# Patient Record
Sex: MALE | Race: ASIAN | NOT HISPANIC OR LATINO | Employment: UNEMPLOYED | URBAN - METROPOLITAN AREA
[De-identification: names, ages, dates, MRNs, and addresses within clinical notes are randomized per-mention and may not be internally consistent; named-entity substitution may affect disease eponyms.]

---

## 2017-05-18 ENCOUNTER — OFFICE VISIT (OUTPATIENT)
Dept: OPHTHALMOLOGY | Facility: CLINIC | Age: 62
End: 2017-05-18
Payer: COMMERCIAL

## 2017-05-18 DIAGNOSIS — E11.3299 DIABETES MELLITUS WITH BACKGROUND RETINOPATHY (H): ICD-10-CM

## 2017-05-18 DIAGNOSIS — H52.4 PRESBYOPIA: ICD-10-CM

## 2017-05-18 DIAGNOSIS — H00.029 MEIBOMITIS, UNSPECIFIED LATERALITY: ICD-10-CM

## 2017-05-18 DIAGNOSIS — Z01.01 ENCOUNTER FOR EXAMINATION OF EYES AND VISION WITH ABNORMAL FINDINGS: Primary | ICD-10-CM

## 2017-05-18 DIAGNOSIS — Z96.1 PSEUDOPHAKIA: ICD-10-CM

## 2017-05-18 DIAGNOSIS — H04.203 EPIPHORA, BILATERAL: ICD-10-CM

## 2017-05-18 PROCEDURE — 92015 DETERMINE REFRACTIVE STATE: CPT | Performed by: OPHTHALMOLOGY

## 2017-05-18 PROCEDURE — 92014 COMPRE OPH EXAM EST PT 1/>: CPT | Performed by: OPHTHALMOLOGY

## 2017-05-18 ASSESSMENT — REFRACTION_MANIFEST
OD_CYLINDER: +0.50
OD_SPHERE: +0.25
OS_ADD: +2.50
OS_SPHERE: -1.25
OS_CYLINDER: +1.00
OD_ADD: +2.50
OS_AXIS: 001
OD_AXIS: 012

## 2017-05-18 ASSESSMENT — VISUAL ACUITY
OS_PH_SC: 40+2
OD_SC: 20/25
OS_SC+: +1
OS_SC: 20/60
METHOD: SNELLEN - LINEAR

## 2017-05-18 ASSESSMENT — SLIT LAMP EXAM - LIDS
COMMENTS: 2+ DERMATOCHALASIS - UPPER LID, JONES 1 SNIP APPEARS OPEN LEFT EYE; CLOSED OD, UPPER PUNCTUM APPEARS OPEN;  1+ MEIBOMIAN GLAND DYSFUNCTION
COMMENTS: 2+ DERMATOCHALASIS - UPPER LID, JONES 1 SNIP APPEARS OPEN LEFT EYE; CLOSED OD, UPPER PUNCTUM APPEARS OPEN;  1+ MEIBOMIAN GLAND DYSFUNCTION

## 2017-05-18 ASSESSMENT — EXTERNAL EXAM - RIGHT EYE: OD_EXAM: NORMAL

## 2017-05-18 ASSESSMENT — TONOMETRY
OD_IOP_MMHG: 15
IOP_METHOD: APPLANATION
OS_IOP_MMHG: 13

## 2017-05-18 ASSESSMENT — CUP TO DISC RATIO
OS_RATIO: 0.4
OD_RATIO: 0.4

## 2017-05-18 ASSESSMENT — CONF VISUAL FIELD
OD_NORMAL: 1
OS_NORMAL: 1

## 2017-05-18 ASSESSMENT — EXTERNAL EXAM - LEFT EYE: OS_EXAM: NORMAL

## 2017-05-18 NOTE — PROGRESS NOTES
Current Eye Medications:  Prescription allergy drop both eyes each morning.  He is unsure of the name.       Subjective:  Comprehensive Eye Exam.  Patient is here for a Diabetic Eye Exam.  He has noticed decreasing vision in each eye, distance and near.  He has over-the-counter readers, but still feels his reading vision is blurred.    Patient is here with an , but mostly understands and speaks English.       Objective:  See Ophthalmology Exam.       Assessment:  Stable mild background diabetic retinopathy both eyes. Posterior capsule opacity approaching visual significance left eye.      ICD-10-CM    1. Encounter for examination of eyes and vision with abnormal findings Z01.01 REFRACTIVE STATUS   2. Presbyopia H52.4 REFRACTIVE STATUS   3. Diabetes mellitus with background retinopathy (H) E11.319 EYE EXAM (SIMPLE-NONBILLABLE)   4. Pseudophakia, os Z96.1    5. Meibomitis, unspecified laterality H00.019    6. Epiphora, bilateral H04.203         Plan: Possible posterior vitreous detachment (sudden onset large floater and/or flashing lights) discussed.   Possible clouding of posterior capsule discussed. Left eye   Stop Maxitrol in your left eye  Glasses Rx given - optional   Call in January 2018 for an appointment in May 2018 for Complete Exam    Dr. Hirsch [phone]

## 2017-05-18 NOTE — MR AVS SNAPSHOT
After Visit Summary   5/18/2017    Heaven Rowe    MRN: [id number]           Patient Information     Date Of Birth          1955        Visit Information        Provider Department      5/18/2017 8:15 AM Jose Ramon Hirsch MD; [employer] [facility]        Today's Diagnoses     Encounter for examination of eyes and vision with abnormal findings    -  1    Presbyopia        Myopia, left        Astigmatism, bilateral        Diabetes mellitus with background retinopathy (H)        Pseudophakia, os        Meibomitis, unspecified laterality        Epiphora, bilateral          Care Instructions    Possible posterior vitreous detachment (sudden onset large floater and/or flashing lights) discussed.   Possible clouding of posterior capsule discussed. Left eye   Stop Maxitrol in your left eye  Glasses Rx given - optional   Call in January 2018 for an appointment in May 2018 for Complete Exam    Dr. Hirsch [phone]    Diabetes weakens the blood vessels all over the body, including the eyes. Damage to the blood vessels in the eyes can cause swelling or bleeding into part of the eye (called the retina). This is called diabetic retinopathy ([facility]-tin--[facility]-the). If not treated, this disease can cause vision loss or blindness.   Symptoms may include blurred or distorted vision, but many people have no symptoms. It's important to see your eye doctor regularly to check for problems.   Early treatment and good control can help protect your vision. Here are the things you can do to help prevent vision loss:      1. Keep your blood sugar levels under tight control.      2. Bring high blood pressure under control.      3. No smoking.      4. Have yearly dilated eye exams.          Follow-ups after your visit        Who to contact     If you have questions or need follow up information about today's clinic visit or your schedule please contact [facility] directly at  "[phone].  Normal or non-critical lab and imaging results will be communicated to you by MyChart, letter or phone within 4 business days after the clinic has received the results. If you do not hear from us within 7 days, please contact the clinic through [employer] or phone. If you have a critical or abnormal lab result, we will notify you by phone as soon as possible.  Submit refill requests through [employer] or call your pharmacy and they will forward the refill request to us. Please allow 3 business days for your refill to be completed.          Additional Information About Your Visit        youbeQ - [employer] Information     [employer] lets you send messages to your doctor, view your test results, renew your prescriptions, schedule appointments and more. To sign up, go to [email or web address] . Click on \"Log in\" on the left side of the screen, which will take you to the Welcome page. Then click on \"Sign up Now\" on the right side of the page.     You will be asked to enter the access code listed below, as well as some personal information. Please follow the directions to create your username and password.     Your access code is: [email or web address]  Expires: 2017  9:37 AM     Your access code will  in 90 days. If you need help or a new code, please call your [place] clinic or [phone].        Care EveryWhere ID     This is your Care EveryWhere ID. This could be used by other organizations to access your [place] medical records  [id number]         Blood Pressure from Last 3 Encounters:   11 128/86    Weight from Last 3 Encounters:   11 55.6 kg (122 lb 9.2 oz)              We Performed the Following     EYE EXAM (SIMPLE-NONBILLABLE)     REFRACTIVE STATUS        Primary Care Provider Office Phone # Fax #    Cesar Rowe [phone]       [address] [address]        Thank you!     Thank you for choosing [facility]  for your care. Our " goal is always to provide you with excellent care. Hearing back from our patients is one way we can continue to improve our services. Please take a few minutes to complete the written survey that you may receive in the mail after your visit with us. Thank you!             Your Updated Medication List - Protect others around you: Learn how to safely use, store and throw away your medicines at www.disposemymeds.org.          This list is accurate as of: 5/18/17  9:37 AM.  Always use your most recent med list.                   Brand Name Dispense Instructions for use    aspirin 325 MG tablet      Take 81 mg by mouth daily       ATENOLOL PO      Take  by mouth.       ketorolac 0.5 % ophthalmic solution    ACULAR    1 Bottle    Place 1 drop into both eyes 4 times daily as needed       ketotifen 0.025 % Soln ophthalmic solution    ZADITOR    1 Bottle    Place 1 drop into both eyes 2 times daily       lisinopril 5 MG tablet    PRINIVIL/ZESTRIL     Take 5 mg by mouth daily.       METFORMIN HCL PO      Take 500 mg by mouth daily.       VYTORIN PO      Take  by mouth.

## 2017-05-18 NOTE — LETTER
"                                                                                                   Jose Ramon Hirsch M.D.  [facility]  [address] [place] [address]        May 18, 2017  Heaven Rowe   MRN: [id number]    Dear Dr. Rowe,          Thank you for referring Heaven Rowe, regarding his annual diabetic eye exam.  I had an opportunity to evaluate him on 5/18/2017.  Following  are my findings.     Current Eye Medications:  Prescription allergy drop both eyes each morning.  He is unsure of the name.       Subjective:  Comprehensive Eye Exam.  Patient is here for a Diabetic Eye Exam.  He has noticed decreasing vision in each eye, distance and near.  He has over-the-counter readers, but still feels his reading vision is blurred.    Patient is here with an , but mostly understands and speaks English.       Objective:  See Ophthalmology Exam.       Assessment:  Stable mild background diabetic retinopathy both eyes. Posterior capsule opacity approaching visual significance left eye.      ICD-10-CM    1. Encounter for examination of eyes and vision with abnormal findings Z01.01 REFRACTIVE STATUS   2. Presbyopia H52.4 REFRACTIVE STATUS   3. Diabetes mellitus with background retinopathy (H) E11.319 EYE EXAM (SIMPLE-NONBILLABLE)   4. Pseudophakia, os Z96.1    5. Meibomitis, unspecified laterality H00.019    6. Epiphora, bilateral H04.203         Plan: Possible posterior vitreous detachment (sudden onset large floater and/or flashing lights) discussed.   Possible clouding of posterior capsule discussed. Left eye   Stop Maxitrol in your left eye  Glasses Rx given - optional   Call in January 2018 for an appointment in May 2018 for Complete Exam    Dr. Hirsch [phone]             Base Eye Exam     Visual Acuity (Snellen - Linear)      Right Left   Dist sc 20/25 20/60 +1   Dist ph sc  40+2   Near sc 16@ 24\" 2- @24\"         Tonometry (Applanation, 8:52 AM)      " Right Left   Pressure 15 13       Anterior chamber is slightly shallow, right eye, but adequate.      Pupils      Dark Light APD   Right 4 3 None   Left 4.5 4 None         Visual Fields      Right Left   Result Full Full         Extraocular Movement      Right Left   Result Full, Ortho Full, Ortho         Neuro/Psych     Oriented x3:  Yes    Mood/Affect:  Normal      Dilation     Both eyes:  1.0% Mydriacyl, 2.5% Gurpreet Synephrine @ 8:54 AM            Slit Lamp and Fundus Exam     External Exam      Right Left    External Normal Normal      Slit Lamp Exam      Right Left    Lids/Lashes 2+ Dermatochalasis - upper lid, dahl 1 snip appears open left eye; closed od, upper punctum appears open;  1+ Meibomian gland dysfunction 2+ Dermatochalasis - upper lid, dahl 1 snip appears open left eye; closed od, upper punctum appears open;  1+ Meibomian gland dysfunction    Conjunctiva/Sclera Pinguecula N/T Pinguecula N/T    Cornea Clear Clear    Anterior Chamber Deep and quiet Deep and quiet    Iris Dilated Dilated    Lens Clear Centered posterior chamber intraocular lens, 1-2+ Posterior capsular opacification, superior toward va    Vitreous Normal Normal      Fundus Exam      Right Left    Disc Normal Normal    C/D Ratio 0.4 0.4    Macula Normal Normal    Vessels Normal Normal    Periphery rare dot rare dot            Refraction     Wearing Rx     +2.50 over-the-counter readers.  Didn't bring.      Manifest Refraction      Sphere Cylinder Axis Dist Add Near   Right +0.25 +0.50 012 20/20 +2.50 1   Left -1.25 +1.00 001 20-1 +2.50 1         Final Rx      Sphere Cylinder Axis Add   Right +0.25 +0.50 012 +2.50   Left -1.25 +1.00 001 +2.50       Type:  Bifocal    Expiration Date:  5/19/2018    Comments:  Opposite signs in sphere.                Thank you again for allowing us to participate in the care of your patient.  If you have any further questions or concerns please feel free to contact me at [phone].      Sincerely,      Jose Ramon Hirsch M.D.  [facility]  [address] [place] [address]  [phone]

## 2017-05-18 NOTE — PATIENT INSTRUCTIONS
Possible posterior vitreous detachment (sudden onset large floater and/or flashing lights) discussed.   Possible clouding of posterior capsule discussed. Left eye   Stop Maxitrol in your left eye  Glasses Rx given - optional   Call in January 2018 for an appointment in May 2018 for Complete Exam    Dr. Hirsch [phone]    Diabetes weakens the blood vessels all over the body, including the eyes. Damage to the blood vessels in the eyes can cause swelling or bleeding into part of the eye (called the retina). This is called diabetic retinopathy ([facility]-tin--[facility]-thee). If not treated, this disease can cause vision loss or blindness.   Symptoms may include blurred or distorted vision, but many people have no symptoms. It's important to see your eye doctor regularly to check for problems.   Early treatment and good control can help protect your vision. Here are the things you can do to help prevent vision loss:      1. Keep your blood sugar levels under tight control.      2. Bring high blood pressure under control.      3. No smoking.      4. Have yearly dilated eye exams.

## 2018-05-30 ENCOUNTER — OFFICE VISIT (OUTPATIENT)
Dept: OPHTHALMOLOGY | Facility: CLINIC | Age: 63
End: 2018-05-30
Payer: COMMERCIAL

## 2018-05-30 DIAGNOSIS — Z96.1 PSEUDOPHAKIA: ICD-10-CM

## 2018-05-30 DIAGNOSIS — H00.029 MEIBOMIANITIS, UNSPECIFIED LATERALITY: ICD-10-CM

## 2018-05-30 DIAGNOSIS — E11.3299 DIABETES MELLITUS WITH BACKGROUND RETINOPATHY (H): Primary | ICD-10-CM

## 2018-05-30 DIAGNOSIS — H04.203 BILATERAL EPIPHORA: ICD-10-CM

## 2018-05-30 PROCEDURE — 92014 COMPRE OPH EXAM EST PT 1/>: CPT | Performed by: OPHTHALMOLOGY

## 2018-05-30 ASSESSMENT — CONF VISUAL FIELD
OD_NORMAL: 1
OS_NORMAL: 1

## 2018-05-30 ASSESSMENT — VISUAL ACUITY
OS_SC: J2
OS_SC: 20/40
OD_SC: J7
OD_SC: 20/20
METHOD: SNELLEN - LINEAR

## 2018-05-30 ASSESSMENT — TONOMETRY
OD_IOP_MMHG: 14
OS_IOP_MMHG: 17
IOP_METHOD: APPLANATION

## 2018-05-30 ASSESSMENT — REFRACTION_MANIFEST
OS_CYLINDER: +1.50
OS_SPHERE: -1.50
OD_ADD: +3.00
OD_CYLINDER: +0.75
OD_AXIS: 009
OD_SPHERE: +0.25
OS_ADD: +3.00
OS_AXIS: 177

## 2018-05-30 ASSESSMENT — CUP TO DISC RATIO
OS_RATIO: 0.4
OD_RATIO: 0.4

## 2018-05-30 ASSESSMENT — EXTERNAL EXAM - LEFT EYE: OS_EXAM: NORMAL

## 2018-05-30 ASSESSMENT — EXTERNAL EXAM - RIGHT EYE: OD_EXAM: NORMAL

## 2018-05-30 NOTE — PROGRESS NOTES
Current Eye Medications:  None     Subjective:  Complete diabetic eye exam   Patient complains that in the am, his left eye is stuck shut and has to clean it up to open. Left eye ceja a lot.     Objective:  See Ophthalmology Exam.       Assessment:  Stable mild background diabetic retinopathy both eyes.  Posterior capsule opacity left eye approaching visual significance.      ICD-10-CM    1. Diabetes mellitus with background retinopathy (H) E11.3299    2. Pseudophakia, os Z96.1    3. Meibomianitis, unspecified laterality H00.019    4. Bilateral epiphora H04.203         Plan:  Possible clouding of posterior capsule discussed left eye.  Possible posterior vitreous detachment (sudden onset large floater and/or flashing lights) discussed.   Use artificial tears up to 4 times daily both eyes. (Refresh Tears, Systane Ultra/Balance, or Theratears)   Glasses Rx given - optional   Call in January 2019 for an appointment in May 2019 for Complete Exam.    Dr. Hirsch [phone]

## 2018-05-30 NOTE — LETTER
5/30/2018         RE: Heaven Rowe  [address]  [place] [address]        Dear Colleague,    Thank you for referring your patient, Heaven Rowe, to the [facility]. Please see a copy of my visit note below.                Current Eye Medications:  None     Subjective:  Complete diabetic eye exam   Patient complains that in the am, his left eye is stuck shut and has to clean it up to open. Left eye ceja a lot.     Objective:  See Ophthalmology Exam.       Assessment:  Stable mild background diabetic retinopathy both eyes.  Posterior capsule opacity left eye approaching visual significance.      ICD-10-CM    1. Diabetes mellitus with background retinopathy (H) E11.3299    2. Pseudophakia, os Z96.1    3. Meibomianitis, unspecified laterality H00.019    4. Bilateral epiphora H04.203         Plan:  Possible clouding of posterior capsule discussed left eye.  Possible posterior vitreous detachment (sudden onset large floater and/or flashing lights) discussed.   Use artificial tears up to 4 times daily both eyes. (Refresh Tears, Systane Ultra/Balance, or Theratears)   Glasses Rx given - optional   Call in January 2019 for an appointment in May 2019 for Complete Exam.    Dr. Hirsch [phone]         Again, thank you for allowing me to participate in the care of your patient.        Sincerely,        Jose Ramon Hirsch MD

## 2018-05-30 NOTE — MR AVS SNAPSHOT
After Visit Summary   5/30/2018    Heaven Rowe    MRN: [id number]           Patient Information     Date Of Birth          1955        Visit Information        Provider Department      5/30/2018 8:15 AM Jose Ramon Hirsch MD; [facility] [employer] [facility]        Today's Diagnoses     Pseudophakia, os    -  1    Diabetes mellitus with background retinopathy (H)        Meibomianitis, unspecified laterality        Bilateral epiphora          Care Instructions    Possible clouding of posterior capsule discussed left eye.  Possible posterior vitreous detachment (sudden onset large floater and/or flashing lights) discussed.   Use artificial tears up to 4 times daily both eyes. (Refresh Tears, Systane Ultra/Balance, or Theratears)   Glasses Rx given - optional   Call in January 2019 for an appointment in May 2019 for Complete Exam.    Dr. Hirsch [phone]    Diabetes weakens the blood vessels all over the body, including the eyes. Damage to the blood vessels in the eyes can cause swelling or bleeding into part of the eye (called the retina). This is called diabetic retinopathy ([facility]-tin--[facility]-the). If not treated, this disease can cause vision loss or blindness.   Symptoms may include blurred or distorted vision, but many people have no symptoms. It's important to see your eye doctor regularly to check for problems.   Early treatment and good control can help protect your vision. Here are the things you can do to help prevent vision loss:      1. Keep your blood sugar levels under tight control.      2. Bring high blood pressure under control.      3. No smoking.      4. Have yearly dilated eye exams.           Follow-ups after your visit        Who to contact     If you have questions or need follow up information about today's clinic visit or your schedule please contact [facility] directly at [phone].  Normal or non-critical lab and imaging  results will be communicated to you by MyChart, letter or phone within 4 business days after the clinic has received the results. If you do not hear from us within 7 days, please contact the clinic through MyChart or phone. If you have a critical or abnormal lab result, we will notify you by phone as soon as possible.  Submit refill requests through [email or web address] or call your pharmacy and they will forward the refill request to us. Please allow 3 business days for your refill to be completed.          Additional Information About Your Visit        Care EveryWhere ID     This is your Care EveryWhere ID. This could be used by other organizations to access your [place] medical records  [id number]         Blood Pressure from Last 3 Encounters:   12/06/11 128/86    Weight from Last 3 Encounters:   12/06/11 55.6 kg (122 lb 9.2 oz)              Today, you had the following     No orders found for display       Primary Care Provider Office Phone # Fax #    Cesar Rowe [phone]       [place] [address]        Equal Access to Services     ONDINA VANCE : Hadii aad ku hadasho Soomaali, waaxda luqadaha, qaybta kaalmada adeegyada, waxay idiin haylaurita schaefer . So [facility] [phone].    ATENCIÓN: Si habla español, tiene a terry disposición servicios gratuitos de asistencia lingüística. [facility] [phone].    We comply with applicable federal civil rights laws and [place] laws. We do not discriminate on the basis of race, color, national origin, age, disability, sex, sexual orientation, or gender identity.            Thank you!     Thank you for choosing [facility]  for your care. Our goal is always to provide you with excellent care. Hearing back from our patients is one way we can continue to improve our services. Please take a few minutes to complete the written survey that you may receive in the mail after your visit with us. Thank you!              Your Updated Medication List - Protect others around you: Learn how to safely use, store and throw away your medicines at www.disposemymeds.org.          This list is accurate as of 5/30/18  9:39 AM.  Always use your most recent med list.                   Brand Name Dispense Instructions for use Diagnosis    aspirin 325 MG tablet      Take 81 mg by mouth daily        ATENOLOL PO      Take  by mouth.        ketorolac 0.5 % ophthalmic solution    ACULAR    1 Bottle    Place 1 drop into both eyes 4 times daily as needed    Epiphora, bilateral       ketotifen 0.025 % Soln ophthalmic solution    ZADITOR    1 Bottle    Place 1 drop into both eyes 2 times daily    Epiphora, unspecified laterality       lisinopril 5 MG tablet    PRINIVIL/ZESTRIL     Take 5 mg by mouth daily.        METFORMIN HCL PO      Take 500 mg by mouth daily.        VYTORIN PO      Take  by mouth.

## 2018-05-30 NOTE — PATIENT INSTRUCTIONS
Possible clouding of posterior capsule discussed left eye.  Possible posterior vitreous detachment (sudden onset large floater and/or flashing lights) discussed.   Use artificial tears up to 4 times daily both eyes. (Refresh Tears, Systane Ultra/Balance, or Theratears)   Glasses Rx given - optional   Call in January 2019 for an appointment in May 2019 for Complete Exam.    Dr. Hirsch [phone]    Diabetes weakens the blood vessels all over the body, including the eyes. Damage to the blood vessels in the eyes can cause swelling or bleeding into part of the eye (called the retina). This is called diabetic retinopathy ([facility]-tin-[facility]-puh-thee). If not treated, this disease can cause vision loss or blindness.   Symptoms may include blurred or distorted vision, but many people have no symptoms. It's important to see your eye doctor regularly to check for problems.   Early treatment and good control can help protect your vision. Here are the things you can do to help prevent vision loss:      1. Keep your blood sugar levels under tight control.      2. Bring high blood pressure under control.      3. No smoking.      4. Have yearly dilated eye exams.

## 2018-10-09 ENCOUNTER — OFFICE VISIT (OUTPATIENT)
Dept: OPHTHALMOLOGY | Facility: CLINIC | Age: 63
End: 2018-10-09
Payer: MEDICAID

## 2018-10-09 DIAGNOSIS — H04.203 BILATERAL EPIPHORA: ICD-10-CM

## 2018-10-09 DIAGNOSIS — E11.3299 DIABETES MELLITUS WITH BACKGROUND RETINOPATHY (H): ICD-10-CM

## 2018-10-09 DIAGNOSIS — Z96.1 PSEUDOPHAKIA: ICD-10-CM

## 2018-10-09 DIAGNOSIS — H34.8120 CENTRAL RETINAL VEIN OCCLUSION WITH MACULAR EDEMA OF LEFT EYE (H): Primary | ICD-10-CM

## 2018-10-09 PROCEDURE — 92014 COMPRE OPH EXAM EST PT 1/>: CPT | Performed by: OPHTHALMOLOGY

## 2018-10-09 PROCEDURE — T1013 SIGN LANG/ORAL INTERPRETER: HCPCS | Mod: U3 | Performed by: OPHTHALMOLOGY

## 2018-10-09 ASSESSMENT — TONOMETRY
OD_IOP_MMHG: 11
OS_IOP_MMHG: 11
IOP_METHOD: APPLANATION

## 2018-10-09 ASSESSMENT — EXTERNAL EXAM - RIGHT EYE: OD_EXAM: NORMAL

## 2018-10-09 ASSESSMENT — VISUAL ACUITY
OD_SC: 20/20
OS_SC: CF@2'
OD_SC+: -2
METHOD: SNELLEN - LINEAR

## 2018-10-09 ASSESSMENT — EXTERNAL EXAM - LEFT EYE: OS_EXAM: NORMAL

## 2018-10-09 ASSESSMENT — CUP TO DISC RATIO
OS_RATIO: 0.4
OD_RATIO: 0.4

## 2018-10-09 NOTE — LETTER
10/9/2018         RE: Heaven Rowe  [address]  [place] [address]        Dear Colleague,    Thank you for referring your patient, Heaven Rowe, to the [facility]. Please see a copy of my visit note below.     Current Eye Medications:  none     Subjective:  Decreased vision and Pressure feeling with tightness left eye over last 3 days. Vision is doing very well right eye.     Was seen in ER; brain imaging apparently normal     Objective:  See Ophthalmology Exam.       Assessment:  New central retinal vein occlusion left eye.      ICD-10-CM    1. Central retinal vein occlusion with macular edema of left eye H34.8120 OPHTHALMOLOGY ADULT REFERRAL   2. Diabetes mellitus with background retinopathy (H) E11.3299    3. Pseudophakia, os Z96.1    4. Bilateral epiphora H04.203         Plan:  Referral to [facility] for evaluation of new central retinal vein occlusion left eye.  Return visit as previously planned or sooner if suggested by retina doctor.     Jose Ramon Hirsch M.D.  [phone]           Again, thank you for allowing me to participate in the care of your patient.        Sincerely,        Jose Ramon Hirsch MD

## 2018-10-09 NOTE — PROGRESS NOTES
Current Eye Medications:  none     Subjective:  Decreased vision and Pressure feeling with tightness left eye over last 3 days. Vision is doing very well right eye.     Was seen in ER; brain imaging apparently normal     Objective:  See Ophthalmology Exam.       Assessment:  New central retinal vein occlusion left eye.      ICD-10-CM    1. Central retinal vein occlusion with macular edema of left eye H34.8120 OPHTHALMOLOGY ADULT REFERRAL   2. Diabetes mellitus with background retinopathy (H) E11.3299    3. Pseudophakia, os Z96.1    4. Bilateral epiphora H04.203         Plan:  Referral to [facility] for evaluation of new central retinal vein occlusion left eye.  Return visit as previously planned or sooner if suggested by retina doctor.     Jose Ramon Hirsch M.D.  [phone]

## 2018-10-09 NOTE — MR AVS SNAPSHOT
After Visit Summary   10/9/2018    Heaven Rowe    MRN: [id number]           Patient Information     Date Of Birth          1955        Visit Information        Provider Department      10/9/2018 2:00 PM Jose Ramon Hirsch MD; [facility]        Today's Diagnoses     Pseudophakia, os    -  1    Central retinal vein occlusion with macular edema of left eye          Care Instructions    Referral to [facility] for evaluation.  Return visit as previously planned or sooner if suggested by retina doctor.     Jose Ramon Hirsch M.D.  [phone]            Follow-ups after your visit        Additional Services     OPHTHALMOLOGY ADULT REFERRAL       Your provider has referred you to:  [facility]      Please be aware that coverage of these services is subject to the terms and limitations of your health insurance plan.  Call member services at your health plan with any benefit or coverage questions.      Please bring the following to your appointment:  >>   Any x-rays, CTs or MRIs which have been performed.  Contact the facility where they were done to arrange for  prior to your scheduled appointment.  Any new CT, MRI or other procedures ordered by your specialist must be performed at a [place] facility or coordinated by your clinic's referral office.    >>   List of current medications   >>   This referral request   >>   Any documents/labs given to you for this referral                  Who to contact     If you have questions or need follow up information about today's clinic visit or your schedule please contact [facility] directly at [phone].  Normal or non-critical lab and imaging results will be communicated to you by MyChart, letter or phone within 4 business days after the clinic has received the results. If you do not hear from us within 7 days, please contact the clinic through MyChart or phone. If you have a critical or  abnormal lab result, we will notify you by phone as soon as possible.  Submit refill requests through [facility] or call your pharmacy and they will forward the refill request to us. Please allow 3 business days for your refill to be completed.          Additional Information About Your Visit        Care EveryWhere ID     This is your Care EveryWhere ID. This could be used by other organizations to access your [place] medical records  [id number]         Blood Pressure from Last 3 Encounters:   12/06/11 128/86    Weight from Last 3 Encounters:   12/06/11 55.6 kg (122 lb 9.2 oz)              We Performed the Following     OPHTHALMOLOGY ADULT REFERRAL        Primary Care Provider Office Phone # Fax #    Cesar Rowe [phone]       [address] [address]        Equal Access to Services     ONDINA VANCE : Hadii aad ku hadasho Soomaali, waaxda luqadaha, qaybta kaalmada adeegyada, helen schaefer . So [facility] [phone].    ATENCIÓN: Si habla español, tiene a terry disposición servicios gratuitos de asistencia lingüística. Llame al [phone].    We comply with applicable federal civil rights laws and [place] laws. We do not discriminate on the basis of race, color, national origin, age, disability, sex, sexual orientation, or gender identity.            Thank you!     Thank you for choosing [facility]  for your care. Our goal is always to provide you with excellent care. Hearing back from our patients is one way we can continue to improve our services. Please take a few minutes to complete the written survey that you may receive in the mail after your visit with us. Thank you!             Your Updated Medication List - Protect others around you: Learn how to safely use, store and throw away your medicines at www.disposemymeds.org.          This list is accurate as of 10/9/18  3:24 PM.  Always use your most recent med list.                    Brand Name Dispense Instructions for use Diagnosis    aspirin 325 MG tablet      Take 81 mg by mouth daily        ATENOLOL PO      Take  by mouth.        ketorolac 0.5 % ophthalmic solution    ACULAR    1 Bottle    Place 1 drop into both eyes 4 times daily as needed    Epiphora, bilateral       ketotifen 0.025 % Soln ophthalmic solution    ZADITOR    1 Bottle    Place 1 drop into both eyes 2 times daily    Epiphora, unspecified laterality       lisinopril 5 MG tablet    PRINIVIL/ZESTRIL     Take 5 mg by mouth daily.        METFORMIN HCL PO      Take 500 mg by mouth daily.        VYTORIN PO      Take  by mouth.

## 2018-10-09 NOTE — PATIENT INSTRUCTIONS
Referral to [facility] for evaluation.  Return visit as previously planned or sooner if suggested by retina doctor.     Jose Ramon Hirsch M.D.  [phone]

## 2018-10-29 ENCOUNTER — DOCUMENTATION ONLY (OUTPATIENT)
Dept: OPHTHALMOLOGY | Facility: CLINIC | Age: 63
End: 2018-10-29

## 2018-11-30 ENCOUNTER — APPOINTMENT (OUTPATIENT)
Dept: OPTOMETRY | Facility: CLINIC | Age: 63
End: 2018-11-30
Payer: COMMERCIAL

## 2018-11-30 PROCEDURE — 92341 FIT SPECTACLES BIFOCAL: CPT | Performed by: OPTOMETRIST

## 2018-12-28 ENCOUNTER — DOCUMENTATION ONLY (OUTPATIENT)
Dept: OPHTHALMOLOGY | Facility: CLINIC | Age: 63
End: 2018-12-28

## 2019-06-03 ENCOUNTER — OFFICE VISIT (OUTPATIENT)
Dept: OPHTHALMOLOGY | Facility: CLINIC | Age: 64
End: 2019-06-03
Payer: COMMERCIAL

## 2019-06-03 DIAGNOSIS — H00.029 MEIBOMIANITIS, UNSPECIFIED LATERALITY: ICD-10-CM

## 2019-06-03 DIAGNOSIS — H34.8120 CENTRAL RETINAL VEIN OCCLUSION WITH MACULAR EDEMA OF LEFT EYE (H): ICD-10-CM

## 2019-06-03 DIAGNOSIS — H04.203 BILATERAL EPIPHORA: ICD-10-CM

## 2019-06-03 DIAGNOSIS — H26.492 POSTERIOR CAPSULAR OPACIFICATION VISUALLY SIGNIFICANT OF LEFT EYE: ICD-10-CM

## 2019-06-03 DIAGNOSIS — Z96.1 PSEUDOPHAKIA: ICD-10-CM

## 2019-06-03 DIAGNOSIS — E11.3299 DIABETES MELLITUS WITH BACKGROUND RETINOPATHY (H): ICD-10-CM

## 2019-06-03 DIAGNOSIS — Z01.01 ENCOUNTER FOR EXAMINATION OF EYES AND VISION WITH ABNORMAL FINDINGS: Primary | ICD-10-CM

## 2019-06-03 DIAGNOSIS — H52.4 PRESBYOPIA: ICD-10-CM

## 2019-06-03 PROCEDURE — 92014 COMPRE OPH EXAM EST PT 1/>: CPT | Performed by: OPHTHALMOLOGY

## 2019-06-03 PROCEDURE — 92015 DETERMINE REFRACTIVE STATE: CPT | Performed by: OPHTHALMOLOGY

## 2019-06-03 ASSESSMENT — CUP TO DISC RATIO
OS_RATIO: 0.4
OD_RATIO: 0.4

## 2019-06-03 ASSESSMENT — TONOMETRY
OD_IOP_MMHG: 11
OS_IOP_MMHG: 10
IOP_METHOD: APPLANATION

## 2019-06-03 ASSESSMENT — REFRACTION_MANIFEST
OS_ADD: +3.00
OS_CYLINDER: +1.00
OD_CYLINDER: +0.25
OD_AXIS: 020
OD_SPHERE: +0.25
OS_AXIS: 173
OS_SPHERE: -1.25
OD_ADD: +3.00

## 2019-06-03 ASSESSMENT — EXTERNAL EXAM - RIGHT EYE: OD_EXAM: NORMAL

## 2019-06-03 ASSESSMENT — VISUAL ACUITY
OS_SC: 20/100
METHOD: SNELLEN - LINEAR
OD_SC: 20/20

## 2019-06-03 ASSESSMENT — CONF VISUAL FIELD
OD_NORMAL: 1
OS_NORMAL: 1

## 2019-06-03 ASSESSMENT — EXTERNAL EXAM - LEFT EYE: OS_EXAM: NORMAL

## 2019-06-03 NOTE — PROGRESS NOTES
Current Eye Medications:  no     Subjective:  DIABETIC EYE EXAM   Pt reports he continues to see well in his right eye and vision remains poor in his left eye.     Objective:  See Ophthalmology Exam.       Assessment:  Posterior capsule opacity left eye approaching visual significance.  Otherwise stable eye exam.      ICD-10-CM    1. Encounter for examination of eyes and vision with abnormal findings Z01.01 REFRACTIVE STATUS   2. Presbyopia H52.4 REFRACTIVE STATUS   3. Diabetes mellitus with background retinopathy (H) E11.3299 EYE EXAM (SIMPLE-NONBILLABLE)   4. Pseudophakia, os Z96.1    5. Central retinal vein occlusion with macular edema of left eye H34.8120    6. Posterior capsular opacification visually significant of left eye H26.492    7. Bilateral epiphora H04.203    8. Meibomianitis, unspecified laterality H00.029         Plan:  Continue same glasses.  Ok to continue using over the counter readers as needed for close up vision.  Use artificial tears up to 4 times daily both eyes.  (Refresh Tears, Systane Ultra/Balance, or Theratears)  Possible clouding of posterior capsule left eye discussed.  Possible posterior vitreous detachment (sudden onset large floater and/or flashing lights) both eyes discussed.  Continue care with Dr. Duckworth - will discuss YAG cap in the future and contact for scheduling.    Jose Ramon Hirsch M.D.  [phone]

## 2019-06-03 NOTE — LETTER
6/3/2019         RE: Heaven Rowe  [address]  [place] [address]        Dear Colleague,    Thank you for referring your patient, Heaven Rowe, to the [facility]. Please see a copy of my visit note below.     Current Eye Medications:  no     Subjective:  DIABETIC EYE EXAM   Pt reports he continues to see well in his right eye and vision remains poor in his left eye.     Objective:  See Ophthalmology Exam.       Assessment:  Posterior capsule opacity left eye approaching visual significance.  Otherwise stable eye exam.      ICD-10-CM    1. Encounter for examination of eyes and vision with abnormal findings Z01.01 REFRACTIVE STATUS   2. Presbyopia H52.4 REFRACTIVE STATUS   3. Diabetes mellitus with background retinopathy (H) E11.3299 EYE EXAM (SIMPLE-NONBILLABLE)   4. Pseudophakia, os Z96.1    5. Central retinal vein occlusion with macular edema of left eye H34.8120    6. Posterior capsular opacification visually significant of left eye H26.492    7. Bilateral epiphora H04.203    8. Meibomianitis, unspecified laterality H00.029         Plan:  Continue same glasses.  Ok to continue using over the counter readers as needed for close up vision.  Use artificial tears up to 4 times daily both eyes.  (Refresh Tears, Systane Ultra/Balance, or Theratears)  Possible clouding of posterior capsule left eye discussed.  Possible posterior vitreous detachment (sudden onset large floater and/or flashing lights) both eyes discussed.  Continue care with Dr. Duckworth - will discuss YAG cap in the future and contact for scheduling.    Jose Ramon Hirsch M.D.  [phone]         Again, thank you for allowing me to participate in the care of your patient.        Sincerely,        Jose Ramon Hirsch MD

## 2019-06-03 NOTE — PATIENT INSTRUCTIONS
Continue same glasses.  Ok to continue using over the counter readers as needed for close up vision.  Use artificial tears up to 4 times daily both eyes.  (Refresh Tears, Systane Ultra/Balance, or Theratears)  Possible clouding of posterior capsule left eye discussed.  Possible posterior vitreous detachment (sudden onset large floater and/or flashing lights) both eyes discussed.  Continue care with Dr. Duckworth - will discuss YAG cap in the future and contact for scheduling.    Jose Ramon Hirsch M.D.  [phone]      Patient Education   Diabetes weakens the blood vessels all over the body, including the eyes. Damage to the blood vessels in the eyes can cause swelling or bleeding into part of the eye (called the retina). This is called diabetic retinopathy (ALMA-tin--pu-thee). If not treated, this disease can cause vision loss or blindness.   Symptoms may include blurred or distorted vision, but many people have no symptoms. It's important to see your eye doctor regularly to check for problems.   Early treatment and good control can help protect your vision. Here are the things you can do to help prevent vision loss:      1. Keep your blood sugar levels under tight control.      2. Bring high blood pressure under control.      3. No smoking.      4. Have yearly dilated eye exams.

## 2020-06-10 ENCOUNTER — OFFICE VISIT (OUTPATIENT)
Dept: OPHTHALMOLOGY | Facility: CLINIC | Age: 65
End: 2020-06-10
Payer: COMMERCIAL

## 2020-06-10 DIAGNOSIS — H52.4 PRESBYOPIA: ICD-10-CM

## 2020-06-10 DIAGNOSIS — Z96.1 PSEUDOPHAKIA: ICD-10-CM

## 2020-06-10 DIAGNOSIS — H34.8120 CENTRAL RETINAL VEIN OCCLUSION WITH MACULAR EDEMA OF LEFT EYE (H): ICD-10-CM

## 2020-06-10 DIAGNOSIS — Z01.01 ENCOUNTER FOR EXAMINATION OF EYES AND VISION WITH ABNORMAL FINDINGS: Primary | ICD-10-CM

## 2020-06-10 DIAGNOSIS — E11.3299 DIABETES MELLITUS WITH BACKGROUND RETINOPATHY (H): ICD-10-CM

## 2020-06-10 DIAGNOSIS — H00.029 MEIBOMIANITIS, UNSPECIFIED LATERALITY: ICD-10-CM

## 2020-06-10 PROCEDURE — 92015 DETERMINE REFRACTIVE STATE: CPT | Performed by: OPHTHALMOLOGY

## 2020-06-10 PROCEDURE — 92014 COMPRE OPH EXAM EST PT 1/>: CPT | Performed by: OPHTHALMOLOGY

## 2020-06-10 ASSESSMENT — REFRACTION_MANIFEST
OD_ADD: +2.50
OD_CYLINDER: SPHERE
OS_ADD: +2.50
OD_SPHERE: +0.25
OS_AXIS: 175
OS_CYLINDER: +1.25
OS_SPHERE: -1.75

## 2020-06-10 ASSESSMENT — VISUAL ACUITY
OS_SC: 20/100
OD_SC: 20/20
OS_PH_SC: 20/50
METHOD: SNELLEN - LINEAR

## 2020-06-10 ASSESSMENT — TONOMETRY
IOP_METHOD: APPLANATION
OS_IOP_MMHG: 13
OD_IOP_MMHG: 13

## 2020-06-10 ASSESSMENT — EXTERNAL EXAM - LEFT EYE: OS_EXAM: NORMAL

## 2020-06-10 ASSESSMENT — CUP TO DISC RATIO
OS_RATIO: 0.4
OD_RATIO: 0.4

## 2020-06-10 ASSESSMENT — EXTERNAL EXAM - RIGHT EYE: OD_EXAM: NORMAL

## 2020-06-10 ASSESSMENT — CONF VISUAL FIELD
OS_NORMAL: 1
OD_NORMAL: 1

## 2020-06-10 NOTE — PATIENT INSTRUCTIONS
Glasses Rx given - optional  Also okay to use over the counter reading glasses.  Possible clouding of posterior capsule left eye discussed.   Possible posterior vitreous detachment (sudden onset large floater and/or flashing lights) both eyes discussed.  Use artificial tears up to 4 times daily both eyes. (Refresh Tears, Systane Ultra/Balance, or Theratears)   Continue care with Dr. Pedro.  Call in February 2021 for an appointment in June 2021 for Complete Exam    Dr. Hirsch [phone]

## 2020-06-10 NOTE — LETTER
"    6/10/2020         RE: Heaven Rowe  [address]  [place] [address]        Dear Colleague,    Thank you for referring your patient, Heaven Rowe, to the [facility]. Please see a copy of my visit note below.     Current Eye Medications:  None.     Subjective:  Patient is here for a Diabetic Eye Exam.  blood sugars are \"good.\"   A1C is unknown.  Patient has noticed some black spots in his left eye for the past 2 years.  He wears over-the-counter readers which are working well.  Distance vision is good without correction.       Objective:  See Ophthalmology Exam.       Assessment:  Vision improved left eye with hx of central retinal vein occlusion and antiVEGF injections.  Posterior capsule opacity +/- visually significant.      ICD-10-CM    1. Encounter for examination of eyes and vision with abnormal findings  Z01.01 EYE EXAM (SIMPLE-NONBILLABLE)   2. Diabetes mellitus with background retinopathy (H)  E11.3299 EYE EXAM (SIMPLE-NONBILLABLE)   3. Central retinal vein occlusion with macular edema of left eye (antiVEGF - HC)  H34.8120    4. Pseudophakia, os  Z96.1    5. Meibomianitis, unspecified laterality  H00.029    6. Presbyopia  H52.4 REFRACTIVE STATUS        Plan:  Glasses Rx given - optional  Also okay to use over the counter reading glasses.  Possible clouding of posterior capsule left eye discussed.   Possible posterior vitreous detachment (sudden onset large floater and/or flashing lights) both eyes discussed.  Use artificial tears up to 4 times daily both eyes. (Refresh Tears, Systane Ultra/Balance, or Theratears)   Continue care with Dr. Pedro.  Call in February 2021 for an appointment in June 2021 for Complete Exam    Dr. Hirsch [phone]           Again, thank you for allowing me to participate in the care of your patient.        Sincerely,        Jose Ramon Hirsch MD    "

## 2020-06-10 NOTE — PROGRESS NOTES
" Current Eye Medications:  None.     Subjective:  Patient is here for a Diabetic Eye Exam.  blood sugars are \"good.\"   A1C is unknown.  Patient has noticed some black spots in his left eye for the past 2 years.  He wears over-the-counter readers which are working well.  Distance vision is good without correction.       Objective:  See Ophthalmology Exam.       Assessment:  Vision improved left eye with hx of central retinal vein occlusion and antiVEGF injections.  Posterior capsule opacity +/- visually significant.      ICD-10-CM    1. Encounter for examination of eyes and vision with abnormal findings  Z01.01 EYE EXAM (SIMPLE-NONBILLABLE)   2. Diabetes mellitus with background retinopathy (H)  E11.3299 EYE EXAM (SIMPLE-NONBILLABLE)   3. Central retinal vein occlusion with macular edema of left eye (antiVEGF - HC)  H34.8120    4. Pseudophakia, os  Z96.1    5. Meibomianitis, unspecified laterality  H00.029    6. Presbyopia  H52.4 REFRACTIVE STATUS        Plan:  Glasses Rx given - optional  Also okay to use over the counter reading glasses.  Possible clouding of posterior capsule left eye discussed.   Possible posterior vitreous detachment (sudden onset large floater and/or flashing lights) both eyes discussed.  Use artificial tears up to 4 times daily both eyes. (Refresh Tears, Systane Ultra/Balance, or Theratears)   Continue care with Dr. Pedro.  Call in February 2021 for an appointment in June 2021 for Complete Exam    Dr. Hirsch [phone]         "

## 2020-12-28 ENCOUNTER — DOCUMENTATION ONLY (OUTPATIENT)
Dept: OPHTHALMOLOGY | Facility: CLINIC | Age: 65
End: 2020-12-28

## 2021-06-02 ENCOUNTER — OFFICE VISIT (OUTPATIENT)
Dept: OPHTHALMOLOGY | Facility: CLINIC | Age: 66
End: 2021-06-02
Payer: COMMERCIAL

## 2021-06-02 DIAGNOSIS — H52.4 PRESBYOPIA: ICD-10-CM

## 2021-06-02 DIAGNOSIS — E11.3299 DIABETES MELLITUS WITH BACKGROUND RETINOPATHY (H): ICD-10-CM

## 2021-06-02 DIAGNOSIS — Z01.01 ENCOUNTER FOR EXAMINATION OF EYES AND VISION WITH ABNORMAL FINDINGS: Primary | ICD-10-CM

## 2021-06-02 DIAGNOSIS — H34.8120 CENTRAL RETINAL VEIN OCCLUSION WITH MACULAR EDEMA OF LEFT EYE (H): ICD-10-CM

## 2021-06-02 DIAGNOSIS — H04.203 BILATERAL EPIPHORA: ICD-10-CM

## 2021-06-02 DIAGNOSIS — H43.812 POSTERIOR VITREOUS DETACHMENT OF LEFT EYE: ICD-10-CM

## 2021-06-02 DIAGNOSIS — Z96.1 PSEUDOPHAKIA: ICD-10-CM

## 2021-06-02 PROCEDURE — 92015 DETERMINE REFRACTIVE STATE: CPT | Performed by: OPHTHALMOLOGY

## 2021-06-02 PROCEDURE — 92014 COMPRE OPH EXAM EST PT 1/>: CPT | Performed by: OPHTHALMOLOGY

## 2021-06-02 ASSESSMENT — CONF VISUAL FIELD
METHOD: COUNTING FINGERS
OD_NORMAL: 1
OS_NORMAL: 1

## 2021-06-02 ASSESSMENT — REFRACTION_MANIFEST
OD_SPHERE: +0.25
OS_CYLINDER: +0.75
OD_CYLINDER: +0.75
OD_ADD: +2.50
OD_AXIS: 015
OS_AXIS: 010
OS_SPHERE: -1.25
OS_ADD: +2.50

## 2021-06-02 ASSESSMENT — EXTERNAL EXAM - RIGHT EYE: OD_EXAM: NORMAL

## 2021-06-02 ASSESSMENT — VISUAL ACUITY
OS_PH_SC: 20/40
OD_SC: 20/25
OS_SC+: -1
OD_SC+: -2
METHOD: SNELLEN - LINEAR
OS_SC: 20/100

## 2021-06-02 ASSESSMENT — TONOMETRY
OS_IOP_MMHG: 12
OD_IOP_MMHG: 11
IOP_METHOD: APPLANATION

## 2021-06-02 ASSESSMENT — CUP TO DISC RATIO
OS_RATIO: 0.4
OD_RATIO: 0.4

## 2021-06-02 ASSESSMENT — EXTERNAL EXAM - LEFT EYE: OS_EXAM: NORMAL

## 2021-06-02 NOTE — LETTER
6/2/2021         RE: Heaven Rowe  [address]  [place] [address]        Dear Colleague,    [facility] you [facility] your patient, Heaven Rowe[facility] Please see a copy of my visit note below.     Current Eye Medications:  None     Subjective:  Complete eye exam. Vision is OK right eye. Vision is improved left eye, but still blurry. Wakes in morning with left eye feeling dry, has trouble opening it. Sometimes watering with milky substance left eye. See's black thing daily that looks like a fly for last 3 years. Has light flashes left eye sometimes for last year. Not having any curtain effect. No eye pain in either eye.   Sees Dr. Duckworth and was given injection left eye on 5/25/21.    Using prescription readers - doing well.     Objective:  See Ophthalmology Exam.       Assessment:  Vision improved left eye with antiVEGF treatment in patient with hx of central retinal vein occlusion.  Stable mild background diabetic retinopathy both eyes.  Posterior vitreous detachment left eye.      ICD-10-CM    1. Encounter for examination of eyes and vision with abnormal findings  Z01.01 EYE EXAM (SIMPLE-NONBILLABLE)   2. Presbyopia  H52.4 REFRACTION   3. Diabetes mellitus with background retinopathy (H)  E11.3299    4. Central retinal vein occlusion with macular edema of left eye (antiVEGF - HC)  H34.8120    5. Pseudophakia, os  Z96.1    6. Bilateral epiphora  H04.203    7. Posterior vitreous detachment of left eye  H43.812         Plan:   Glasses Rx given - optional.  Use artificial tears up to 4 times daily both eyes. (Refresh Tears, Systane Ultra/Balance, or Theratears).  Possible posterior vitreous detachment (sudden onset large floater and/or flashing lights) right eye discussed.  Possible clouding of posterior capsule left eye discussed.  Continue care with Dr. Pedro  Call in February 2022 for an appointment in June 2022 for Complete Exam    Dr. Hirsch [phone]              Again, thank you for allowing me to participate in the care of your patient.        Sincerely,        Jose Ramon Hirsch MD

## 2021-06-02 NOTE — PATIENT INSTRUCTIONS
Glasses Rx given - optional.  Use artificial tears up to 4 times daily both eyes. (Refresh Tears, Systane Ultra/Balance, or Theratears).  Possible posterior vitreous detachment (sudden onset large floater and/or flashing lights) right eye discussed.  Possible clouding of posterior capsule left eye discussed.  Continue care with Dr. Pedro  Call in February 2022 for an appointment in June 2022 for Complete Exam    Dr. Hirsch [phone]    Patient Education   Diabetes weakens the blood vessels all over the body, including the eyes. Damage to the blood vessels in the eyes can cause swelling or bleeding into part of the eye (called the retina). This is called diabetic retinopathy (ALMA-tin--puh-thee). If not treated, this disease can cause vision loss or blindness.   Symptoms may include blurred or distorted vision, but many people have no symptoms. It's important to see your eye doctor regularly to check for problems.   Early treatment and good control can help protect your vision. Here are the things you can do to help prevent vision loss:      1. Keep your blood sugar levels under tight control.      2. Bring high blood pressure under control.      3. No smoking.      4. Have yearly dilated eye exams.

## 2021-06-02 NOTE — PROGRESS NOTES
Current Eye Medications:  None     Subjective:  Complete eye exam. Vision is OK right eye. Vision is improved left eye, but still blurry. Wakes in morning with left eye feeling dry, has trouble opening it. Sometimes watering with milky substance left eye. See's black thing daily that looks like a fly for last 3 years. Has light flashes left eye sometimes for last year. Not having any curtain effect. No eye pain in either eye.   Sees Dr. Duckworth and was given injection left eye on 5/25/21.    Using prescription readers - doing well.     Objective:  See Ophthalmology Exam.       Assessment:  Vision improved left eye with antiVEGF treatment in patient with hx of central retinal vein occlusion.  Stable mild background diabetic retinopathy both eyes.  Posterior vitreous detachment left eye.      ICD-10-CM    1. Encounter for examination of eyes and vision with abnormal findings  Z01.01 EYE EXAM (SIMPLE-NONBILLABLE)   2. Presbyopia  H52.4 REFRACTION   3. Diabetes mellitus with background retinopathy (H)  E11.3299    4. Central retinal vein occlusion with macular edema of left eye (antiVEGF - HC)  H34.8120    5. Pseudophakia, os  Z96.1    6. Bilateral epiphora  H04.203    7. Posterior vitreous detachment of left eye  H43.812         Plan:   Glasses Rx given - optional.  Use artificial tears up to 4 times daily both eyes. (Refresh Tears, Systane Ultra/Balance, or Theratears).  Possible posterior vitreous detachment (sudden onset large floater and/or flashing lights) right eye discussed.  Possible clouding of posterior capsule left eye discussed.  Continue care with Dr. Pedro  Call in February 2022 for an appointment in June 2022 for Complete Exam    Dr. Hirsch [phone]

## 2021-06-15 ENCOUNTER — APPOINTMENT (OUTPATIENT)
Dept: OPTOMETRY | Facility: CLINIC | Age: 66
End: 2021-06-15
Payer: COMMERCIAL

## 2021-06-15 PROCEDURE — 92341 FIT SPECTACLES BIFOCAL: CPT | Performed by: OPTOMETRIST

## 2022-04-06 ENCOUNTER — DOCUMENTATION ONLY (OUTPATIENT)
Dept: OPHTHALMOLOGY | Facility: CLINIC | Age: 67
End: 2022-04-06
Payer: COMMERCIAL

## 2022-04-25 ENCOUNTER — APPOINTMENT (OUTPATIENT)
Dept: INTERPRETER SERVICES | Facility: CLINIC | Age: 67
End: 2022-04-25
Payer: COMMERCIAL

## 2022-06-03 ENCOUNTER — OFFICE VISIT (OUTPATIENT)
Dept: OPHTHALMOLOGY | Facility: CLINIC | Age: 67
End: 2022-06-03
Payer: COMMERCIAL

## 2022-06-03 DIAGNOSIS — H52.4 PRESBYOPIA: ICD-10-CM

## 2022-06-03 DIAGNOSIS — H34.8120 CENTRAL RETINAL VEIN OCCLUSION WITH MACULAR EDEMA OF LEFT EYE (H): ICD-10-CM

## 2022-06-03 DIAGNOSIS — Z96.1 PSEUDOPHAKIA: ICD-10-CM

## 2022-06-03 DIAGNOSIS — H04.203 BILATERAL EPIPHORA: ICD-10-CM

## 2022-06-03 DIAGNOSIS — Z01.01 ENCOUNTER FOR EXAMINATION OF EYES AND VISION WITH ABNORMAL FINDINGS: Primary | ICD-10-CM

## 2022-06-03 DIAGNOSIS — H26.492 LEFT POSTERIOR CAPSULAR OPACIFICATION: ICD-10-CM

## 2022-06-03 DIAGNOSIS — H00.029 MEIBOMIANITIS, UNSPECIFIED LATERALITY: ICD-10-CM

## 2022-06-03 DIAGNOSIS — E11.3299 DIABETES MELLITUS WITH BACKGROUND RETINOPATHY (H): ICD-10-CM

## 2022-06-03 PROCEDURE — 92015 DETERMINE REFRACTIVE STATE: CPT | Performed by: OPHTHALMOLOGY

## 2022-06-03 PROCEDURE — 92014 COMPRE OPH EXAM EST PT 1/>: CPT | Performed by: OPHTHALMOLOGY

## 2022-06-03 RX ORDER — AMLODIPINE BESYLATE 10 MG/1
TABLET ORAL
COMMUNITY
End: 2023-06-05

## 2022-06-03 ASSESSMENT — REFRACTION_MANIFEST
OD_ADD: +2.75
OS_CYLINDER: +0.75
OS_AXIS: 180
OD_CYLINDER: +0.50
OS_ADD: +2.75
OD_AXIS: 015
OD_SPHERE: PLANO
OS_SPHERE: -1.50

## 2022-06-03 ASSESSMENT — VISUAL ACUITY
OD_SC: 20/20
METHOD: SNELLEN - LINEAR
OS_SC: 20/60
OS_PH_SC: 20/40
OD_SC+: -1

## 2022-06-03 ASSESSMENT — EXTERNAL EXAM - LEFT EYE: OS_EXAM: NORMAL

## 2022-06-03 ASSESSMENT — TONOMETRY
IOP_METHOD: APPLANATION
OD_IOP_MMHG: 14
OS_IOP_MMHG: 14

## 2022-06-03 ASSESSMENT — CONF VISUAL FIELD
OS_NORMAL: 1
OD_NORMAL: 1

## 2022-06-03 ASSESSMENT — CUP TO DISC RATIO
OD_RATIO: 0.4
OS_RATIO: 0.4

## 2022-06-03 ASSESSMENT — EXTERNAL EXAM - RIGHT EYE: OD_EXAM: NORMAL

## 2022-06-03 NOTE — PROGRESS NOTES
Current Eye Medications:  None    Declined  services     Subjective:  Here for complete eye exam.   Patient states that overall vision is stable at distance and near.   Left eye ceja often with some discharge. Started months ago. Worse in the morning.   Last appointment with [facility] 05/24/2022, next appointment at [facility] 07/26/2022.     Objective:  See Ophthalmology Exam.       Assessment:  Posterior capsule opacity left eye probably visually significant.  Stable central retinal vein occlusion left eye with macular edema controlled with ongoing antiVEGF treatment.  Stable mild background diabetic retinopathy both eyes.      ICD-10-CM    1. Encounter for examination of eyes and vision with abnormal findings  Z01.01    2. Presbyopia  H52.4 REFRACTION   3. Diabetes mellitus with background retinopathy (H)  E11.3299 EYE EXAM (SIMPLE-NONBILLABLE)   4. Central retinal vein occlusion with macular edema of left eye (antiVEGF - HC)  H34.8120    5. Pseudophakia, os  Z96.1    6. Meibomianitis, unspecified laterality  H00.029    7. Bilateral epiphora  H04.203    8. Left posterior capsular opacification  H26.492         Plan:  Glasses Rx given - optional   May use artificial tears up to 4 times daily both eyes. (Refresh Tears, Systane Ultra/Balance, or Theratears)   Discuss the clouding of posterior capsule left eye with Dr. Pedro; may be reasonable to open with a laser if he agrees.  Call in February 2023 for an appointment in June 2023 for Complete Exam    Dr. Hirsch [phone]

## 2022-06-03 NOTE — PATIENT INSTRUCTIONS
Glasses Rx given - optional   May use artificial tears up to 4 times daily both eyes. (Refresh Tears, Systane Ultra/Balance, or Theratears)   Discuss the clouding of posterior capsule left eye with Dr. Pedro; may be reasonable to open with a laser if he agrees.  Call in February 2023 for an appointment in June 2023 for Complete Exam    Dr. Hirsch [phone]     Patient Education   Diabetes weakens the blood vessels all over the body, including the eyes. Damage to the blood vessels in the eyes can cause swelling or bleeding into part of the eye (called the retina). This is called diabetic retinopathy (ALMA-tin-AH-puh-thee). If not treated, this disease can cause vision loss or blindness.   Symptoms may include blurred or distorted vision, but many people have no symptoms. It's important to see your eye doctor regularly to check for problems.   Early treatment and good control can help protect your vision. Here are the things you can do to help prevent vision loss:      1. Keep your blood sugar levels under tight control.      2. Bring high blood pressure under control.      3. No smoking.      4. Have yearly dilated eye exams.

## 2022-06-03 NOTE — LETTER
6/3/2022         RE: Heaven Rowe  [address]  [place] [address]        Dear Colleague,    Thank you for referring your patient, Heaven Rowe, to the [facility]. Please see a copy of my visit note below.    Current Eye Medications:  None    Declined  services     Subjective:  Here for complete eye exam.   Patient states that overall vision is stable at distance and near.   Left eye ceja often with some discharge. Started months ago. Worse in the morning.   Last appointment with [facility] 05/24/2022, next appointment at [facility] 07/26/2022.     Objective:  See Ophthalmology Exam.       Assessment:  Posterior capsule opacity left eye probably visually significant.  Stable central retinal vein occlusion left eye with macular edema controlled with ongoing antiVEGF treatment.  Stable mild background diabetic retinopathy both eyes.      ICD-10-CM    1. Encounter for examination of eyes and vision with abnormal findings  Z01.01    2. Presbyopia  H52.4 REFRACTION   3. Diabetes mellitus with background retinopathy (H)  E11.3299 EYE EXAM (SIMPLE-NONBILLABLE)   4. Central retinal vein occlusion with macular edema of left eye (antiVEGF - HC)  H34.8120    5. Pseudophakia, os  Z96.1    6. Meibomianitis, unspecified laterality  H00.029    7. Bilateral epiphora  H04.203    8. Left posterior capsular opacification  H26.492         Plan:  Glasses Rx given - optional   May use artificial tears up to 4 times daily both eyes. (Refresh Tears, Systane Ultra/Balance, or Theratears)   Discuss the clouding of posterior capsule left eye with Dr. Pedro; may be reasonable to open with a laser if he agrees.  Call in February 2023 for an appointment in June 2023 for Complete Exam    Dr. Hirsch [phone]          Again, thank you for allowing me to participate in the care of your patient.        Sincerely,        Jose Ramon Hirsch MD

## 2022-06-21 ENCOUNTER — APPOINTMENT (OUTPATIENT)
Dept: OPTOMETRY | Facility: CLINIC | Age: 67
End: 2022-06-21
Payer: COMMERCIAL

## 2022-06-21 PROCEDURE — 92341 FIT SPECTACLES BIFOCAL: CPT | Performed by: OPTOMETRIST

## 2022-08-18 ENCOUNTER — OFFICE VISIT (OUTPATIENT)
Dept: OPHTHALMOLOGY | Facility: CLINIC | Age: 67
End: 2022-08-18
Payer: COMMERCIAL

## 2022-08-18 DIAGNOSIS — H26.492 POSTERIOR CAPSULAR OPACIFICATION VISUALLY SIGNIFICANT OF LEFT EYE: Primary | ICD-10-CM

## 2022-08-18 PROCEDURE — 99207 PR NO CHARGE LOS: CPT | Performed by: OPHTHALMOLOGY

## 2022-08-18 PROCEDURE — 66821 AFTER CATARACT LASER SURGERY: CPT | Mod: LT | Performed by: OPHTHALMOLOGY

## 2022-08-18 ASSESSMENT — VISUAL ACUITY
METHOD: SNELLEN - LINEAR
OS_PH_SC: 20/60
OD_SC: 20/20
OS_SC: 20/70
OS_SC+: -1
OD_SC+: -2

## 2022-08-18 ASSESSMENT — TONOMETRY
IOP_METHOD: APPLANATION
OD_IOP_MMHG: XX
OS_IOP_MMHG: 14

## 2022-08-18 ASSESSMENT — EXTERNAL EXAM - LEFT EYE: OS_EXAM: NORMAL

## 2022-08-18 ASSESSMENT — EXTERNAL EXAM - RIGHT EYE: OD_EXAM: NORMAL

## 2022-08-18 ASSESSMENT — CUP TO DISC RATIO: OS_RATIO: 0.4

## 2022-08-18 NOTE — PATIENT INSTRUCTIONS
Your eye may be slightly red, sore, and blurry for a few days.  You may notice some floaters for a few days.  Keep scheduled return visit for a intraocular pressure check and refraction in about one week.    Jose Ramon Hirsch M.D.  [phone]

## 2022-08-18 NOTE — LETTER
8/18/2022         RE: Heaven Rowe  [address]  [place] [address]        Dear Colleague,    Thank you for referring your patient, Heaven Rowe, to the [facility]. Please see a copy of my visit note below.     Current Eye Medications:  None     Subjective:  Yag capsulotomy left eye today. Vision is OK right eye. Blurry left eye. No eye pain or discomfort in either eye.      Objective:  See Ophthalmology Exam.       Assessment:  Visually significant posterior capsule opacity left eye.       Plan:  Yag Capsulotomy performed without problems left eye under Proparacaine anesthetic.  Well tolerated.  Number of applications: 81  Power of applications: 2.1 mJ  Iopidine given.  Patient understands vision may be limited by retinal disease.    Jose Ramon Hirsch M.D.                  Again, thank you for allowing me to participate in the care of your patient.        Sincerely,        Jose Ramon Hirsch MD

## 2022-08-18 NOTE — PROGRESS NOTES
Current Eye Medications:  None     Subjective:  Yag capsulotomy left eye today. Vision is OK right eye. Blurry left eye. No eye pain or discomfort in either eye.      Objective:  See Ophthalmology Exam.       Assessment:  Visually significant posterior capsule opacity left eye.       Plan:  Yag Capsulotomy performed without problems left eye under Proparacaine anesthetic.  Well tolerated.  Number of applications: 81  Power of applications: 2.1 mJ  Iopidine given.  Patient understands vision may be limited by retinal disease.    Jose Ramon Hirsch M.D.

## 2022-08-26 ENCOUNTER — OFFICE VISIT (OUTPATIENT)
Dept: OPHTHALMOLOGY | Facility: CLINIC | Age: 67
End: 2022-08-26
Payer: COMMERCIAL

## 2022-08-26 DIAGNOSIS — Z96.1 PSEUDOPHAKIA: Primary | ICD-10-CM

## 2022-08-26 PROCEDURE — 99024 POSTOP FOLLOW-UP VISIT: CPT | Performed by: OPHTHALMOLOGY

## 2022-08-26 ASSESSMENT — TONOMETRY
IOP_METHOD: APPLANATION
OS_IOP_MMHG: 17

## 2022-08-26 ASSESSMENT — REFRACTION_MANIFEST
OS_AXIS: 175
OS_ADD: +2.75
OS_CYLINDER: +1.00
OS_SPHERE: -1.00

## 2022-08-26 ASSESSMENT — VISUAL ACUITY
OD_SC: 20/20
METHOD: SNELLEN - LINEAR
OS_SC: 20/80
OS_PH_SC+: -
OS_PH_SC: 20/50

## 2022-08-26 ASSESSMENT — EXTERNAL EXAM - LEFT EYE: OS_EXAM: NORMAL

## 2022-08-26 ASSESSMENT — EXTERNAL EXAM - RIGHT EYE: OD_EXAM: NORMAL

## 2022-08-26 NOTE — PATIENT INSTRUCTIONS
"Glasses prescription given - optional  Use artificial tears up to four times a day (like Refresh Optive, Systane Balance, TheraTears, or generic artificial tears are ok. Avoid \"get the red out\" drops).   Possible posterior vitreous detachment (sudden onset large floater and/or flashing lights) both eyes discussed.   Continue care with retina specialist.  Call in February 2023 for an appointment in June 2023 for Complete Exam    Dr. Hirsch [phone]   "

## 2022-08-26 NOTE — PROGRESS NOTES
" Current Eye Medications:  None.     Subjective:  Status/Post Yag Capsulotomy left eye:  8-18-22.  Vision in his left eye is clearer since the laser, \"not perfect, but better.\"  He declines an  today.      Objective:  See Ophthalmology Exam.       Assessment:  Doing well s/p Yag Caps left eye.      Plan:  Glasses prescription given - optional  Use artificial tears up to four times a day (like Refresh Optive, Systane Balance, TheraTears, or generic artificial tears are ok. Avoid \"get the red out\" drops).   Possible posterior vitreous detachment (sudden onset large floater and/or flashing lights) both eyes discussed.   Continue care with retina specialist.  Call in February 2023 for an appointment in June 2023 for Complete Exam    Dr. Hirsch [phone]   "

## 2022-08-26 NOTE — LETTER
"    8/26/2022         RE: Heaven Rowe  [address]  [place] [address]        Dear Colleague,    Thank you for referring your patient, Heaven Rowe, to the [facility]. Please see a copy of my visit note below.     Current Eye Medications:  None.     Subjective:  Status/Post Yag Capsulotomy left eye:  8-18-22.  Vision in his left eye is clearer since the laser, \"not perfect, but better.\"  He declines an  today.      Objective:  See Ophthalmology Exam.       Assessment:  Doing well s/p Yag Caps left eye.      Plan:  Glasses prescription given - optional  Use artificial tears up to four times a day (like Refresh Optive, Systane Balance, TheraTears, or generic artificial tears are ok. Avoid \"get the red out\" drops).   Possible posterior vitreous detachment (sudden onset large floater and/or flashing lights) both eyes discussed.   Continue care with retina specialist.  Call in February 2023 for an appointment in June 2023 for Complete Exam    Dr. Hirsch [phone]       Again, thank you for allowing me to participate in the care of your patient.        Sincerely,        Jose Ramon Hirsch MD    "

## 2023-06-05 ENCOUNTER — OFFICE VISIT (OUTPATIENT)
Dept: OPHTHALMOLOGY | Facility: CLINIC | Age: 68
End: 2023-06-05
Payer: COMMERCIAL

## 2023-06-05 DIAGNOSIS — H34.8120 CENTRAL RETINAL VEIN OCCLUSION WITH MACULAR EDEMA OF LEFT EYE (H): ICD-10-CM

## 2023-06-05 DIAGNOSIS — Z96.1 PSEUDOPHAKIA: ICD-10-CM

## 2023-06-05 DIAGNOSIS — H00.029 MEIBOMIANITIS, UNSPECIFIED LATERALITY: ICD-10-CM

## 2023-06-05 DIAGNOSIS — H04.203 BILATERAL EPIPHORA: ICD-10-CM

## 2023-06-05 DIAGNOSIS — Z01.01 ENCOUNTER FOR EXAMINATION OF EYES AND VISION WITH ABNORMAL FINDINGS: Primary | ICD-10-CM

## 2023-06-05 DIAGNOSIS — H52.4 PRESBYOPIA: ICD-10-CM

## 2023-06-05 DIAGNOSIS — E11.3299 DIABETES MELLITUS WITH BACKGROUND RETINOPATHY (H): ICD-10-CM

## 2023-06-05 PROCEDURE — 92015 DETERMINE REFRACTIVE STATE: CPT | Performed by: OPHTHALMOLOGY

## 2023-06-05 PROCEDURE — 92014 COMPRE OPH EXAM EST PT 1/>: CPT | Performed by: OPHTHALMOLOGY

## 2023-06-05 RX ORDER — ASPIRIN 81 MG/1
TABLET, COATED ORAL
COMMUNITY
Start: 2023-06-01

## 2023-06-05 RX ORDER — ALLOPURINOL 100 MG/1
TABLET ORAL
COMMUNITY
Start: 2023-04-14

## 2023-06-05 RX ORDER — MECOBALAMIN 5000 MCG
TABLET,DISINTEGRATING ORAL
COMMUNITY
Start: 2023-06-01

## 2023-06-05 RX ORDER — LEVOTHYROXINE SODIUM 88 UG/1
TABLET ORAL
COMMUNITY
Start: 2023-05-09

## 2023-06-05 RX ORDER — METOPROLOL SUCCINATE 50 MG/1
TABLET, EXTENDED RELEASE ORAL
COMMUNITY
Start: 2023-06-01

## 2023-06-05 RX ORDER — AMLODIPINE AND BENAZEPRIL HYDROCHLORIDE 10; 40 MG/1; MG/1
CAPSULE ORAL
COMMUNITY
Start: 2023-04-07

## 2023-06-05 ASSESSMENT — REFRACTION_MANIFEST
OS_CYLINDER: +0.50
OD_SPHERE: PLANO
OS_AXIS: 170
OD_AXIS: 010
OS_SPHERE: -1.50
OS_ADD: +2.75
OD_ADD: +2.75
OD_CYLINDER: +0.50

## 2023-06-05 ASSESSMENT — VISUAL ACUITY
OD_SC: 20/20
OS_SC: 20/100
OD_SC: J16
OS_SC: J10
METHOD: SNELLEN - LINEAR
OS_PH_SC: 20/60
OS_PH_SC+: -1
OD_SC+: -1

## 2023-06-05 ASSESSMENT — TONOMETRY
OS_IOP_MMHG: 17
IOP_METHOD: APPLANATION
OD_IOP_MMHG: 14

## 2023-06-05 ASSESSMENT — CONF VISUAL FIELD
OS_SUPERIOR_NASAL_RESTRICTION: 0
OD_SUPERIOR_NASAL_RESTRICTION: 0
OD_SUPERIOR_TEMPORAL_RESTRICTION: 0
OD_NORMAL: 1
OS_NORMAL: 1
OD_INFERIOR_NASAL_RESTRICTION: 0
OD_INFERIOR_TEMPORAL_RESTRICTION: 0
OS_SUPERIOR_TEMPORAL_RESTRICTION: 0
OS_INFERIOR_TEMPORAL_RESTRICTION: 0
OS_INFERIOR_NASAL_RESTRICTION: 0

## 2023-06-05 ASSESSMENT — CUP TO DISC RATIO
OD_RATIO: 0.4
OS_RATIO: 0.4

## 2023-06-05 ASSESSMENT — EXTERNAL EXAM - LEFT EYE: OS_EXAM: NORMAL

## 2023-06-05 ASSESSMENT — EXTERNAL EXAM - RIGHT EYE: OD_EXAM: NORMAL

## 2023-06-05 NOTE — LETTER
"    6/5/2023         RE: Heaven Rowe  [address]  [place] [address]        Dear Colleague,    Thank you for referring your patient, Heaven Rowe, to the [facility]. Please see a copy of my visit note below.     Current Eye Medications:  None.      Subjective:  Patient is here for a Diabetic Eye Exam.  No results found for: A1C  Vision in his right eye is good.  Vision in his left eye remains blurry, but probably the same as last year.  He wears prescription reading glasses, but did not bring them today.  He declines an , but requests staff to be masked while in the exam room.    Patient states recent A1C 6.9.          Objective:  See Ophthalmology Exam.       Assessment:  Stable central retinal vein occlusion left eye with macular edema controlled with ongoing antiVEGF treatment.  Stable mild background diabetic retinopathy both eyes.      ICD-10-CM    1. Encounter for examination of eyes and vision with abnormal findings  Z01.01       2. Presbyopia  H52.4       3. Diabetes mellitus with background retinopathy (H)  E11.3299       4. Pseudophakia, Yag Caps, os  Z96.1       5. Central retinal vein occlusion with macular edema of left eye (antiVEGF - HC)  H34.8120       6. Bilateral epiphora  H04.203       7. Meibomianitis, unspecified laterality  H00.029            Plan:  Glasses prescription given - optional  May use artificial tears up to four times a day (like Refresh Optive, Systane Balance, TheraTears, or generic artificial tears are ok. Avoid \"get the red out\" drops).   Possible posterior vitreous detachment (sudden onset large floater and/or flashing lights) right eye discussed.   Continue care with retina specialist.  Call in February 2024 for an appointment in June 2024 for Complete Exam    Dr. Hirsch [phone]           Again, thank you for allowing me to participate in the care of your patient.        Sincerely,        Jose Ramon Hirsch MD    "

## 2023-06-05 NOTE — PROGRESS NOTES
" Current Eye Medications:  None.      Subjective:  Patient is here for a Diabetic Eye Exam.  No results found for: A1C  Vision in his right eye is good.  Vision in his left eye remains blurry, but probably the same as last year.  He wears prescription reading glasses, but did not bring them today.  He declines an , but requests staff to be masked while in the exam room.    Patient states recent A1C 6.9.          Objective:  See Ophthalmology Exam.       Assessment:  Stable central retinal vein occlusion left eye with macular edema controlled with ongoing antiVEGF treatment.  Stable mild background diabetic retinopathy both eyes.      ICD-10-CM    1. Encounter for examination of eyes and vision with abnormal findings  Z01.01       2. Presbyopia  H52.4       3. Diabetes mellitus with background retinopathy (H)  E11.3299       4. Pseudophakia, Yag Caps, os  Z96.1       5. Central retinal vein occlusion with macular edema of left eye (antiVEGF - HC)  H34.8120       6. Bilateral epiphora  H04.203       7. Meibomianitis, unspecified laterality  H00.029            Plan:  Glasses prescription given - optional  May use artificial tears up to four times a day (like Refresh Optive, Systane Balance, TheraTears, or generic artificial tears are ok. Avoid \"get the red out\" drops).   Possible posterior vitreous detachment (sudden onset large floater and/or flashing lights) right eye discussed.   Continue care with retina specialist.  Call in February 2024 for an appointment in June 2024 for Complete Exam    Dr. Hirsch [phone]       "

## 2023-06-05 NOTE — PATIENT INSTRUCTIONS
"Glasses prescription given - optional  May use artificial tears up to four times a day (like Refresh Optive, Systane Balance, TheraTears, or generic artificial tears are ok. Avoid \"get the red out\" drops).   Possible posterior vitreous detachment (sudden onset large floater and/or flashing lights) right eye discussed.   Continue care with retina specialist.  Call in February 2024 for an appointment in June 2024 for Complete Exam    Dr. Hirsch [phone]    Patient Education   Diabetes weakens the blood vessels all over the body, including the eyes. Damage to the blood vessels in the eyes can cause swelling or bleeding into part of the eye (called the retina). This is called diabetic retinopathy (ALMA-tin--pu-thee). If not treated, this disease can cause vision loss or blindness.   Symptoms may include blurred or distorted vision, but many people have no symptoms. It's important to see your eye doctor regularly to check for problems.   Early treatment and good control can help protect your vision. Here are the things you can do to help prevent vision loss:      1. Keep your blood sugar levels under tight control.      2. Bring high blood pressure under control.      3. No smoking.      4. Have yearly dilated eye exams.         "

## 2023-06-20 ENCOUNTER — APPOINTMENT (OUTPATIENT)
Dept: OPTOMETRY | Facility: CLINIC | Age: 68
End: 2023-06-20
Payer: COMMERCIAL

## 2023-06-20 PROCEDURE — 92341 FIT SPECTACLES BIFOCAL: CPT | Performed by: OPHTHALMOLOGY

## 2023-09-12 ENCOUNTER — TRANSFERRED RECORDS (OUTPATIENT)
Dept: HEALTH INFORMATION MANAGEMENT | Facility: CLINIC | Age: 68
End: 2023-09-12
Payer: COMMERCIAL

## 2023-09-12 LAB — RETINOPATHY: NEGATIVE

## 2023-09-29 ENCOUNTER — TELEPHONE (OUTPATIENT)
Dept: OPHTHALMOLOGY | Facility: CLINIC | Age: 68
End: 2023-09-29
Payer: COMMERCIAL

## 2023-09-29 ENCOUNTER — TELEPHONE (OUTPATIENT)
Dept: OPHTHALMOLOGY | Facility: CLINIC | Age: 68
End: 2023-09-29

## 2023-09-29 ENCOUNTER — OFFICE VISIT (OUTPATIENT)
Dept: OPHTHALMOLOGY | Facility: CLINIC | Age: 68
End: 2023-09-29
Payer: COMMERCIAL

## 2023-09-29 DIAGNOSIS — H34.8120 CENTRAL RETINAL VEIN OCCLUSION WITH MACULAR EDEMA OF LEFT EYE (H): ICD-10-CM

## 2023-09-29 DIAGNOSIS — H43.811 PVD (POSTERIOR VITREOUS DETACHMENT), RIGHT: Primary | ICD-10-CM

## 2023-09-29 DIAGNOSIS — Z96.1 PSEUDOPHAKIA, LEFT EYE: ICD-10-CM

## 2023-09-29 PROCEDURE — 99207 FUNDUS PHOTOS OU (BOTH EYES): CPT | Performed by: OPHTHALMOLOGY

## 2023-09-29 PROCEDURE — 92014 COMPRE OPH EXAM EST PT 1/>: CPT | Performed by: OPHTHALMOLOGY

## 2023-09-29 PROCEDURE — 92134 CPTRZ OPH DX IMG PST SGM RTA: CPT | Performed by: OPHTHALMOLOGY

## 2023-09-29 ASSESSMENT — VISUAL ACUITY
OS_SC: 20/125
OS_PH_SC+: -2
OD_SC: 20/20
OS_PH_SC: 20/50
METHOD: SNELLEN - LINEAR
OS_SC+: -1

## 2023-09-29 ASSESSMENT — TONOMETRY
OD_IOP_MMHG: 9
IOP_METHOD: ICARE
OS_IOP_MMHG: 10

## 2023-09-29 ASSESSMENT — CONF VISUAL FIELD
OD_INFERIOR_NASAL_RESTRICTION: 0
OD_NORMAL: 1
OS_INFERIOR_NASAL_RESTRICTION: 0
OS_INFERIOR_TEMPORAL_RESTRICTION: 0
METHOD: COUNTING FINGERS
OS_NORMAL: 1
OS_SUPERIOR_NASAL_RESTRICTION: 0
OD_SUPERIOR_TEMPORAL_RESTRICTION: 0
OD_INFERIOR_TEMPORAL_RESTRICTION: 0
OS_SUPERIOR_TEMPORAL_RESTRICTION: 0
OD_SUPERIOR_NASAL_RESTRICTION: 0

## 2023-09-29 ASSESSMENT — CUP TO DISC RATIO
OD_RATIO: 0.4
OS_RATIO: 0.4

## 2023-09-29 ASSESSMENT — EXTERNAL EXAM - RIGHT EYE: OD_EXAM: NORMAL

## 2023-09-29 ASSESSMENT — EXTERNAL EXAM - LEFT EYE: OS_EXAM: NORMAL

## 2023-09-29 NOTE — TELEPHONE ENCOUNTER
[facility] Call Center    Phone Message    May a detailed message be left on voicemail: yes     Reason for Call: Symptoms or Concerns     If patient has red-flag symptoms, warm transfer to triage line    Current symptom or concern: pt experiencing floaters in his right eye that are consistent.     Symptoms have been present for:  4 day(s)    Has patient previously been seen for this? No    By : NA    Date: NA    Are there any new or worsening symptoms? No    Action Taken: Message routed to:  [facility] eye    Travel Screening: Not Applicable

## 2023-09-29 NOTE — TELEPHONE ENCOUNTER
[facility] Call Center    Phone Message    May a detailed message be left on voicemail: yes     Reason for Call: Symptoms or Concerns     If patient has red-flag symptoms, warm transfer to triage line    Current symptom or concern: Floaters right eye.    Symptoms have been present for:  3 day(s)    Has patient previously been seen for this? No    By :     Date:     Are there any new or worsening symptoms? Yes: Not going away.    Action Taken: Message routed to:  [facility] Ophthalmology    Travel Screening: Not Applicable

## 2023-09-29 NOTE — PROGRESS NOTES
HPI       Floaters Right Eye    In right eye.  Associated symptoms include flashes.             Comments    Here for floaters x3 without flashes. VA seems fine. No flashes or floaters.    Giacomo Corona COT 1:07 PM September 29, 2023       \              Last edited by Dhruv Landry MD on 9/29/2023  1:49 PM.         Review of systems for the eyes was negative other than the pertinent positives/negatives listed in the HPI.      Assessment & Plan    HPI:  Heaven Rowe is a 68 year old male with history of hypothyroid, Diabetes mellitus, Central retinal vein occlusion left eye, diabetic retinopathy treated at [facility] who presents with new floaters right eye.       POHx: entral retinal vein occlusion left eye, diabetic retinopathy, pseudophakia left eye   PMHx: hypothyroid, Diabetes mellitus  Current Medications: allopurinol (ZYLOPRIM) 100 MG tablet, TAKE 2 TABLETS BY MOUTH DAILY FOR GOUT // IB HNUB NOJ OB LUB PAB VERÓNICA KO TAWS VWM  amLODIPine-benazepril (LOTREL) 10-40 MG capsule, TAKE 1 CAPSULE DAILY BY MOUTH FOR HIGH BLOOD PRESSURE // IB HNUB NOJ 1 LUB PAB VERÓNICA NTSHAV SIAB  ASPIRIN LOW DOSE 81 MG EC tablet, TAKE 1 TABLET BY MOUTH DAILY FOR STROKE PREVENTION // IB HNUB NOJ 1 LUB PAB KOM NTSHAV KHIAV ZOO  LANsoprazole (PREVACID) 15 MG DR capsule, TAKE 1 CAPSULE EVERY DAY FOR STOMACH // 1 HNUB NOJ 1 LUB PAB VERÓNICA MOB PLAB/NCAUJ PLAB  levothyroxine (SYNTHROID/LEVOTHROID) 88 MCG tablet, TAKE 1 PILL BY MOUTH EVERY DAY BEFORE BREAKFAST FOR HYPOTHYROIDISM // NOJ IB UA NTEJ KOJ NOJ MOV TXHUA HNUB PAB KOJ LUB CAJ PAS  METFORMIN HCL PO, Take 500 mg by mouth daily.  metoprolol succinate ER (TOPROL XL) 50 MG 24 hr tablet, TAKE 1 TABLET DAILY BY MOUTH FOR HIGH BLOOD PRESSURE // IB HNUB NOJ 1 LUB PAB VERÓNICA NTSHAV SIAB    No current facility-administered medications on file prior to visit.      Current Eye Medications:      Assessment & Plan:  (H43.811) PVD (posterior vitreous detachment), right  (primary  encounter diagnosis)  Retinal detachment precautions discussed including increased flashes, floaters, or curtain coming down across vision, patient instructed to return to clinic for evaluation     (H34.8120) Central retinal vein occlusion with macular edema of left eye (antiVEGF - HC)  Explained at length patients disease and reasoning for continued injections  Has received 28 Avastin as of today    (Z96.1) Pseudophakia, left eye  Doing well      Return in about 4 weeks (around 10/27/2023) for v/t/d [place] .        Dhruv Landry MD     Attending Physician Attestation:  Complete documentation of historical and exam elements from today's encounter can be found in the full encounter summary report (not reduplicated in this progress note).  I personally obtained the chief complaint(s) and history of present illness.  I confirmed and edited as necessary the review of systems, past medical/surgical history, family history, social history, and examination findings as documented by others; and I examined the patient myself.  I personally reviewed the relevant tests, images, and reports as documented above.  I formulated and edited as necessary the assessment and plan and discussed the findings and management plan with the patient and family. - Dhruv Landry MD

## 2023-09-29 NOTE — NURSING NOTE
Chief Complaints and History of Present Illnesses   Patient presents with    Floaters Right Eye     Chief Complaint(s) and History of Present Illness(es)       Floaters Right Eye              Laterality: right eye    Associated symptoms: flashes              Comments    Here for floaters x3 without flashes. VA seems fine. No flashes or floaters.    Giacomo Corona COT 1:07 PM September 29, 2023

## 2023-09-29 NOTE — TELEPHONE ENCOUNTER
Spoke to the patient on the phone:  Onset of floaters right eye x 3 days that is worsened.  Only provider at [facility] is Dr. Hernandez - marlon philip (w/ 16 pt's this PM).   Will escalate urgency to supervisor andsee if any other availabilities with other providers today.

## 2024-06-12 ENCOUNTER — OFFICE VISIT (OUTPATIENT)
Dept: OPHTHALMOLOGY | Facility: CLINIC | Age: 69
End: 2024-06-12
Payer: COMMERCIAL

## 2024-06-12 DIAGNOSIS — E11.3299 DIABETES MELLITUS WITH BACKGROUND RETINOPATHY (H): ICD-10-CM

## 2024-06-12 DIAGNOSIS — Z01.01 ENCOUNTER FOR EXAMINATION OF EYES AND VISION WITH ABNORMAL FINDINGS: Primary | ICD-10-CM

## 2024-06-12 DIAGNOSIS — H52.4 PRESBYOPIA: ICD-10-CM

## 2024-06-12 DIAGNOSIS — H34.8120 CENTRAL RETINAL VEIN OCCLUSION WITH MACULAR EDEMA OF LEFT EYE (H): ICD-10-CM

## 2024-06-12 DIAGNOSIS — Z96.1 PSEUDOPHAKIA: ICD-10-CM

## 2024-06-12 DIAGNOSIS — H25.811 COMBINED FORMS OF AGE-RELATED CATARACT OF RIGHT EYE: ICD-10-CM

## 2024-06-12 DIAGNOSIS — H04.203 BILATERAL EPIPHORA: ICD-10-CM

## 2024-06-12 PROCEDURE — 92134 CPTRZ OPH DX IMG PST SGM RTA: CPT | Performed by: OPHTHALMOLOGY

## 2024-06-12 PROCEDURE — 92015 DETERMINE REFRACTIVE STATE: CPT | Performed by: OPHTHALMOLOGY

## 2024-06-12 PROCEDURE — 92014 COMPRE OPH EXAM EST PT 1/>: CPT | Performed by: OPHTHALMOLOGY

## 2024-06-12 ASSESSMENT — VISUAL ACUITY
METHOD: SNELLEN - LINEAR
OS_CC: 20/80
CORRECTION_TYPE: GLASSES
OS_CC+: +1
OD_CC+: +2
OS_PH_CC+: -2
OD_CC: 20/25
OS_PH_CC: 20/60

## 2024-06-12 ASSESSMENT — REFRACTION_WEARINGRX
OS_SPHERE: -1.50
OS_AXIS: 170
OS_ADD: +2.75
OS_CYLINDER: +0.50
SPECS_TYPE: BIFOCAL-LINED
OD_ADD: +2.75
OD_SPHERE: PLANO
OD_AXIS: 010
OD_CYLINDER: +0.50

## 2024-06-12 ASSESSMENT — TONOMETRY
OD_IOP_MMHG: 14
OS_IOP_MMHG: 18
IOP_METHOD: APPLANATION

## 2024-06-12 ASSESSMENT — CONF VISUAL FIELD
OS_SUPERIOR_NASAL_RESTRICTION: 2
OD_SUPERIOR_NASAL_RESTRICTION: 0
OS_SUPERIOR_TEMPORAL_RESTRICTION: 2
OD_NORMAL: 1
OD_SUPERIOR_TEMPORAL_RESTRICTION: 0
OS_INFERIOR_NASAL_RESTRICTION: 2
OD_INFERIOR_TEMPORAL_RESTRICTION: 0
OS_INFERIOR_TEMPORAL_RESTRICTION: 2
OD_INFERIOR_NASAL_RESTRICTION: 0

## 2024-06-12 ASSESSMENT — REFRACTION_MANIFEST
OS_ADD: +2.75
OD_ADD: +2.75
OD_AXIS: 012
OS_CYLINDER: +0.75
OS_AXIS: 176
OS_SPHERE: -1.50
OD_SPHERE: +0.25
OD_CYLINDER: +1.00

## 2024-06-12 ASSESSMENT — EXTERNAL EXAM - LEFT EYE: OS_EXAM: NORMAL

## 2024-06-12 ASSESSMENT — CUP TO DISC RATIO
OS_RATIO: 0.4
OD_RATIO: 0.4

## 2024-06-12 ASSESSMENT — EXTERNAL EXAM - RIGHT EYE: OD_EXAM: NORMAL
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
## 2024-06-12 NOTE — PATIENT INSTRUCTIONS
"Glasses prescription given - optional    May use artificial tears up to four times a day (like Refresh Optive, Systane Balance, or TheraTears. Avoid \"get the red out\" drops and generic artifical tears).     Continue care with Dr. Benitez as directed.     Obtain OCT today - will call with any concerns.      Call in February 2025 for an appointment in June 2025 for Complete Exam    Dr. Hirsch [phone]      Patient Education   Diabetes weakens the blood vessels all over the body, including the eyes. Damage to the blood vessels in the eyes can cause swelling or bleeding into part of the eye (called the retina). This is called diabetic retinopathy (ALMA-tin-AH-puh-thee). If not treated, this disease can cause vision loss or blindness.   Symptoms may include blurred or distorted vision, but many people have no symptoms. It's important to see your eye doctor regularly to check for problems.   Early treatment and good control can help protect your vision. Here are the things you can do to help prevent vision loss:      1. Keep your blood sugar levels under tight control.      2. Bring high blood pressure under control.      3. No smoking.      4. Have yearly dilated eye exams.     "
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
## 2024-06-13 PROBLEM — H25.811 COMBINED FORMS OF AGE-RELATED CATARACT OF RIGHT EYE: Status: ACTIVE | Noted: 2024-06-13

## 2024-07-18 ENCOUNTER — APPOINTMENT (OUTPATIENT)
Dept: OPTOMETRY | Facility: CLINIC | Age: 69
End: 2024-07-18
Payer: COMMERCIAL

## 2024-07-18 PROCEDURE — 92341 FIT SPECTACLES BIFOCAL: CPT | Performed by: OPHTHALMOLOGY

## 2025-01-29 ENCOUNTER — TRANSFERRED RECORDS (OUTPATIENT)
Dept: HEALTH INFORMATION MANAGEMENT | Facility: CLINIC | Age: 70
End: 2025-01-29
Payer: COMMERCIAL

## 2025-01-29 LAB — RETINOPATHY: NEGATIVE

## 2025-06-13 ENCOUNTER — OFFICE VISIT (OUTPATIENT)
Dept: OPHTHALMOLOGY | Facility: CLINIC | Age: 70
End: 2025-06-13
Payer: COMMERCIAL

## 2025-06-13 DIAGNOSIS — H34.8120 CENTRAL RETINAL VEIN OCCLUSION WITH MACULAR EDEMA OF LEFT EYE (H): ICD-10-CM

## 2025-06-13 DIAGNOSIS — H25.811 COMBINED FORMS OF AGE-RELATED CATARACT OF RIGHT EYE: ICD-10-CM

## 2025-06-13 DIAGNOSIS — H43.811 POSTERIOR VITREOUS DETACHMENT, RIGHT EYE: ICD-10-CM

## 2025-06-13 DIAGNOSIS — H52.4 PRESBYOPIA: ICD-10-CM

## 2025-06-13 DIAGNOSIS — Z96.1 PSEUDOPHAKIA: ICD-10-CM

## 2025-06-13 DIAGNOSIS — Z01.01 ENCOUNTER FOR EXAMINATION OF EYES AND VISION WITH ABNORMAL FINDINGS: Primary | ICD-10-CM

## 2025-06-13 DIAGNOSIS — H04.203 BILATERAL EPIPHORA: ICD-10-CM

## 2025-06-13 PROBLEM — F07.81 POST CONCUSSION SYNDROME: Status: ACTIVE | Noted: 2024-06-14

## 2025-06-13 PROBLEM — G47.9 SLEEP DISTURBANCE: Status: ACTIVE | Noted: 2024-10-03

## 2025-06-13 PROBLEM — H34.8122 CENTRAL RETINAL VEIN OCCLUSION OF LEFT EYE (H): Status: ACTIVE | Noted: 2025-05-02

## 2025-06-13 PROBLEM — H53.9 VISION DISORDER: Status: ACTIVE | Noted: 2024-06-14

## 2025-06-13 PROBLEM — H81.93 DISORDER OF VESTIBULAR FUNCTION OF BOTH EARS: Status: ACTIVE | Noted: 2024-06-14

## 2025-06-13 PROBLEM — K27.9 PEPTIC ULCER: Status: ACTIVE | Noted: 2022-12-07

## 2025-06-13 PROBLEM — N18.30 STAGE 3 CHRONIC KIDNEY DISEASE (H): Status: ACTIVE | Noted: 2023-12-28

## 2025-06-13 PROBLEM — H91.93 BILATERAL HEARING LOSS: Status: ACTIVE | Noted: 2023-03-20

## 2025-06-13 PROBLEM — Z86.73 HISTORY OF STROKE WITHOUT RESIDUAL DEFICITS: Status: ACTIVE | Noted: 2025-03-19

## 2025-06-13 PROBLEM — N28.9 LOW KIDNEY FUNCTION: Status: ACTIVE | Noted: 2023-09-13

## 2025-06-13 PROBLEM — R07.9 CHEST PAIN: Status: ACTIVE | Noted: 2021-12-09

## 2025-06-13 PROBLEM — E78.5 HYPERLIPIDEMIA: Status: ACTIVE | Noted: 2022-12-04

## 2025-06-13 PROBLEM — E55.9 VITAMIN D DEFICIENCY: Status: ACTIVE | Noted: 2023-03-14
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
## 2025-06-13 PROBLEM — S06.0X0A CONCUSSION WITH NO LOSS OF CONSCIOUSNESS: Status: ACTIVE | Noted: 2024-06-14

## 2025-06-13 PROBLEM — I25.2 HISTORY OF MYOCARDIAL INFARCTION: Status: ACTIVE | Noted: 2023-03-05

## 2025-06-13 PROBLEM — M19.90 ARTHRITIS: Status: ACTIVE | Noted: 2023-04-03

## 2025-06-13 PROBLEM — R41.89 COGNITIVE IMPAIRMENT: Status: ACTIVE | Noted: 2024-10-03

## 2025-06-13 PROBLEM — G45.9: Status: ACTIVE | Noted: 2022-12-07

## 2025-06-13 PROBLEM — K21.9 GASTROESOPHAGEAL REFLUX DISEASE WITHOUT ESOPHAGITIS: Status: ACTIVE | Noted: 2023-03-05

## 2025-06-13 PROBLEM — F39 EPISODIC MOOD DISORDER: Status: ACTIVE | Noted: 2024-10-03

## 2025-06-13 PROBLEM — M54.2 NECK PAIN: Status: ACTIVE | Noted: 2024-06-14

## 2025-06-13 PROBLEM — E03.9 HYPOTHYROIDISM: Status: ACTIVE | Noted: 2023-03-14

## 2025-06-13 PROBLEM — M10.9 GOUT: Status: ACTIVE | Noted: 2022-12-07

## 2025-06-13 PROBLEM — G47.9 SLEEP DISORDER: Status: ACTIVE | Noted: 2025-04-14

## 2025-06-13 PROBLEM — R32 URINARY INCONTINENCE: Status: ACTIVE | Noted: 2023-04-03

## 2025-06-13 PROBLEM — Z91.09 ENVIRONMENTAL ALLERGIES: Status: ACTIVE | Noted: 2023-03-05

## 2025-06-13 PROBLEM — Z87.828 HISTORY OF INJURY: Status: ACTIVE | Noted: 2025-05-02

## 2025-06-13 PROCEDURE — 92015 DETERMINE REFRACTIVE STATE: CPT | Performed by: OPHTHALMOLOGY

## 2025-06-13 PROCEDURE — 92014 COMPRE OPH EXAM EST PT 1/>: CPT | Performed by: OPHTHALMOLOGY

## 2025-06-13 ASSESSMENT — REFRACTION_MANIFEST
OS_ADD: +2.75
OS_SPHERE: -1.50
OD_CYLINDER: SPHERE
OD_ADD: +2.75
OS_AXIS: 160
OS_CYLINDER: +1.00
OD_SPHERE: PLANO

## 2025-06-13 ASSESSMENT — VISUAL ACUITY
OS_SC: 20/100
OD_SC: 20/20
METHOD: SNELLEN - LINEAR
OS_PH_SC: 20/50

## 2025-06-13 ASSESSMENT — CONF VISUAL FIELD
OS_SUPERIOR_TEMPORAL_RESTRICTION: 0
OD_SUPERIOR_TEMPORAL_RESTRICTION: 0
OS_INFERIOR_TEMPORAL_RESTRICTION: 0
OD_INFERIOR_NASAL_RESTRICTION: 0
OS_SUPERIOR_NASAL_RESTRICTION: 0
OS_NORMAL: 1
OD_SUPERIOR_NASAL_RESTRICTION: 0
OS_INFERIOR_NASAL_RESTRICTION: 0
OD_INFERIOR_TEMPORAL_RESTRICTION: 0
OD_NORMAL: 1

## 2025-06-13 ASSESSMENT — TONOMETRY
OS_IOP_MMHG: 15
IOP_METHOD: APPLANATION
OD_IOP_MMHG: 09

## 2025-06-13 ASSESSMENT — CUP TO DISC RATIO
OD_RATIO: 0.4
OS_RATIO: 0.4

## 2025-06-13 ASSESSMENT — EXTERNAL EXAM - RIGHT EYE: OD_EXAM: NORMAL

## 2025-06-13 ASSESSMENT — EXTERNAL EXAM - LEFT EYE: OS_EXAM: NORMAL

## 2025-06-13 NOTE — PATIENT INSTRUCTIONS
Glasses prescription given - optional    Continue preservative free artifical tears (Refresh Plus, Systane Complete, or generic preservative free tears are ok.)     Continue care with your retina specialist as directed.     Call in February 2026 for an appointment in June 2026 for Complete Exam    Dr. Hirsch [phone]      Patient Education   Diabetes weakens the blood vessels all over the body, including the eyes. Damage to the blood vessels in the eyes can cause swelling or bleeding into part of the eye (called the retina). This is called diabetic retinopathy (ALMA-tin--pu-thee). If not treated, this disease can cause vision loss or blindness.   Symptoms may include blurred or distorted vision, but many people have no symptoms. It's important to see your eye doctor regularly to check for problems.   Early treatment and good control can help protect your vision. Here are the things you can do to help prevent vision loss:      1. Keep your blood sugar levels under tight control.      2. Bring high blood pressure under control.      3. No smoking.      4. Have yearly dilated eye exams.

## 2025-06-13 NOTE — LETTER
"6/13/2025      Heaven Rowe  [address]  [place] [address]      Dear Colleague,    Thank you for referring your patient, Heaven Rowe, to the [facility]. Please see a copy of my visit note below.     Current Eye Medications:  PF artificial tears both eyes as needed, but usually each morning.       Subjective:  Patient is here for a Diabetic Eye Exam.   Last \"A1C\" 7.6 in March.   Distance vision is adequate without correction.  He primarily wears prescription glasses for reading, but did not bring them today.  Reading vision with correction appears to be unchanged.    He continues to get injections in his left eye every 6 weeks.  Last visit was in February or March, but he is uncertain.    He declines an .       Objective:  See Ophthalmology Exam.       Assessment:  Stable eye exam in patient with diabetes.  No diabetic retinopathy.  Active antiVEGF treatment left eye for central retinal vein occlusion.      ICD-10-CM    1. Encounter for examination of eyes and vision with abnormal findings  Z01.01       2. Presbyopia  H52.4       3. Pseudophakia, Yag Caps, os  Z96.1       4. Combined forms of age-related cataract, mild, of right eye  H25.811       5. Central retinal vein occlusion with macular edema of left eye (antiVEGF - HC)  H34.8120       6. Bilateral epiphora  H04.203       7. Posterior vitreous detachment, right eye  H43.811               Plan:  Glasses prescription given - optional    Continue preservative free artifical tears (Refresh Plus, Systane Complete, or generic preservative free tears are ok.)     Continue care with your retina specialist as directed.     Call in February 2026 for an appointment in June 2026 for Complete Exam    Dr. Hirsch [phone]           Again, thank you for allowing me to participate in the care of your patient.        Sincerely,        Jose Ramon Hirsch MD    Electronically signed"

## 2025-06-13 NOTE — PROGRESS NOTES
" Current Eye Medications:  PF artificial tears both eyes as needed, but usually each morning.       Subjective:  Patient is here for a Diabetic Eye Exam.   Last \"A1C\" 7.6 in March.   Distance vision is adequate without correction.  He primarily wears prescription glasses for reading, but did not bring them today.  Reading vision with correction appears to be unchanged.    He continues to get injections in his left eye every 6 weeks.  Last visit was in February or March, but he is uncertain.    He declines an .       Objective:  See Ophthalmology Exam.       Assessment:  Stable eye exam in patient with diabetes.  No diabetic retinopathy.  Active antiVEGF treatment left eye for central retinal vein occlusion.      ICD-10-CM    1. Encounter for examination of eyes and vision with abnormal findings  Z01.01       2. Presbyopia  H52.4       3. Pseudophakia, Yag Caps, os  Z96.1       4. Combined forms of age-related cataract, mild, of right eye  H25.811       5. Central retinal vein occlusion with macular edema of left eye (antiVEGF - HC)  H34.8120       6. Bilateral epiphora  H04.203       7. Posterior vitreous detachment, right eye  H43.811               Plan:  Glasses prescription given - optional    Continue preservative free artifical tears (Refresh Plus, Systane Complete, or generic preservative free tears are ok.)     Continue care with your retina specialist as directed.     Call in February 2026 for an appointment in June 2026 for Complete Exam    Dr. Hirsch [phone]         "

## 2025-06-14 PROBLEM — H43.811 POSTERIOR VITREOUS DETACHMENT, RIGHT EYE: Status: ACTIVE | Noted: 2025-06-14
